# Patient Record
Sex: FEMALE | Race: WHITE | NOT HISPANIC OR LATINO | Employment: OTHER | URBAN - METROPOLITAN AREA
[De-identification: names, ages, dates, MRNs, and addresses within clinical notes are randomized per-mention and may not be internally consistent; named-entity substitution may affect disease eponyms.]

---

## 2017-11-17 ENCOUNTER — HISTORICAL (OUTPATIENT)
Dept: LAB | Facility: HOSPITAL | Age: 62
End: 2017-11-17

## 2017-11-17 LAB
ABS NEUT (OLG): 5.3 X10(3)/MCL (ref 1.5–6.9)
ALBUMIN SERPL-MCNC: 3.8 GM/DL (ref 3.4–5)
BUN SERPL-MCNC: 25 MG/DL (ref 10–20)
CALCIUM SERPL-MCNC: 9.4 MG/DL (ref 8–10.5)
CHLORIDE SERPL-SCNC: 103 MMOL/L (ref 100–108)
CO2 SERPL-SCNC: 33 MMOL/L (ref 21–35)
CREAT SERPL-MCNC: 1.2 MG/DL (ref 0.7–1.3)
CREAT UR-MCNC: 192.8 MG/DL
DEPRECATED CALCIDIOL+CALCIFEROL SERPL-MC: 8.58 NG/ML (ref 30–80)
ERYTHROCYTE [DISTWIDTH] IN BLOOD BY AUTOMATED COUNT: 13.3 % (ref 11.5–17)
FERRITIN SERPL-MCNC: 52 NG/ML (ref 3–252)
GLUCOSE SERPL-MCNC: 100 MG/DL (ref 75–116)
HCT VFR BLD AUTO: 44.4 % (ref 36–48)
HGB BLD-MCNC: 14.3 GM/DL (ref 12–16)
IRON SATN MFR SERPL: 10 % (ref 15–55)
IRON SERPL-MCNC: 40 MCG/DL (ref 50–170)
MCH RBC QN AUTO: 28 PG (ref 27–34)
MCHC RBC AUTO-ENTMCNC: 32 GM/DL (ref 31–36)
MCV RBC AUTO: 86 FL (ref 80–99)
PHOSPHATE SERPL-MCNC: 4.4 MG/DL (ref 2.6–4.7)
PLATELET # BLD AUTO: 217 X10(3)/MCL (ref 140–400)
PMV BLD AUTO: 10.5 FL (ref 6.8–10)
POTASSIUM SERPL-SCNC: 4.8 MMOL/L (ref 3.6–5.2)
PROT UR STRIP-MCNC: 32 MG/DL (ref 0–10)
PTH-INTACT SERPL-MCNC: 57.7 PG/DL (ref 14–72)
RBC # BLD AUTO: 5.17 X10(6)/MCL (ref 4.2–5.4)
SODIUM SERPL-SCNC: 140 MMOL/L (ref 135–145)
TIBC SERPL-MCNC: 354 MCG/DL (ref 150–375)
TIBC SERPL-MCNC: 394 MCG/DL (ref 250–450)
WBC # SPEC AUTO: 7.1 X10(3)/MCL (ref 4.5–11.5)

## 2018-07-12 ENCOUNTER — HISTORICAL (OUTPATIENT)
Dept: LAB | Facility: HOSPITAL | Age: 63
End: 2018-07-12

## 2018-07-12 LAB
ABS NEUT (OLG): 7.9 X10(3)/MCL (ref 1.5–6.9)
ALBUMIN SERPL-MCNC: 3.9 GM/DL (ref 3.4–5)
BUN SERPL-MCNC: 21 MG/DL (ref 10–20)
CALCIUM SERPL-MCNC: 10.1 MG/DL (ref 8–10.5)
CHLORIDE SERPL-SCNC: 103 MMOL/L (ref 100–108)
CO2 SERPL-SCNC: 25 MMOL/L (ref 21–35)
CREAT SERPL-MCNC: 1.2 MG/DL (ref 0.7–1.3)
CREAT UR-MCNC: 189.8 MG/DL
DEPRECATED CALCIDIOL+CALCIFEROL SERPL-MC: 8 NG/ML (ref 30–80)
ERYTHROCYTE [DISTWIDTH] IN BLOOD BY AUTOMATED COUNT: 13.7 % (ref 11.5–17)
FERRITIN SERPL-MCNC: 55 NG/ML (ref 3–252)
GLUCOSE SERPL-MCNC: 102 MG/DL (ref 75–116)
HCT VFR BLD AUTO: 45.2 % (ref 36–48)
HGB BLD-MCNC: 14.3 GM/DL (ref 12–16)
IRON SATN MFR SERPL: 14 % (ref 15–55)
IRON SERPL-MCNC: 52 MCG/DL (ref 50–170)
MCH RBC QN AUTO: 27 PG (ref 27–34)
MCHC RBC AUTO-ENTMCNC: 32 GM/DL (ref 31–36)
MCV RBC AUTO: 85 FL (ref 80–99)
PHOSPHATE SERPL-MCNC: 4.6 MG/DL (ref 2.6–4.7)
PLATELET # BLD AUTO: 240 X10(3)/MCL (ref 140–400)
PMV BLD AUTO: 10.5 FL (ref 6.8–10)
POTASSIUM SERPL-SCNC: 4 MMOL/L (ref 3.6–5.2)
PROT UR STRIP-MCNC: 47 MG/DL (ref 0–10)
PTH-INTACT SERPL-MCNC: 42.4 PG/ML (ref 18.4–80.1)
RBC # BLD AUTO: 5.3 X10(6)/MCL (ref 4.2–5.4)
SODIUM SERPL-SCNC: 141 MMOL/L (ref 135–145)
TIBC SERPL-MCNC: 323 MCG/DL (ref 150–375)
TIBC SERPL-MCNC: 375 MCG/DL (ref 250–450)
WBC # SPEC AUTO: 10.3 X10(3)/MCL (ref 4.5–11.5)

## 2018-07-26 ENCOUNTER — HISTORICAL (OUTPATIENT)
Dept: ADMINISTRATIVE | Facility: HOSPITAL | Age: 63
End: 2018-07-26

## 2018-08-30 ENCOUNTER — HISTORICAL (OUTPATIENT)
Dept: LAB | Facility: HOSPITAL | Age: 63
End: 2018-08-30

## 2018-08-30 LAB
CHOLEST SERPL-MCNC: 159 MG/DL (ref 140–200)
CHOLEST/HDLC SERPL: 2 MG/DL (ref 0–8)
HDLC SERPL-MCNC: 67 MG/DL (ref 35–59)
LDLC SERPL CALC-MCNC: 84 MG/DL (ref 100–129)
TRIGL SERPL-MCNC: 92 MG/DL (ref 35–150)
VLDLC SERPL CALC-MCNC: 18 MG/DL

## 2018-10-17 ENCOUNTER — HOSPITAL ENCOUNTER (OUTPATIENT)
Dept: MEDSURG UNIT | Facility: HOSPITAL | Age: 63
End: 2018-10-18
Attending: INTERNAL MEDICINE | Admitting: INTERNAL MEDICINE

## 2018-10-18 LAB
ABS NEUT (OLG): 7.41 X10(3)/MCL (ref 2.1–9.2)
ALBUMIN SERPL-MCNC: 2.9 GM/DL (ref 3.4–5)
ALBUMIN/GLOB SERPL: 0.9 RATIO (ref 1.1–2)
ALP SERPL-CCNC: 78 UNIT/L (ref 38–126)
ALT SERPL-CCNC: 19 UNIT/L (ref 12–78)
AST SERPL-CCNC: 18 UNIT/L (ref 15–37)
BASOPHILS # BLD AUTO: 0 X10(3)/MCL (ref 0–0.2)
BASOPHILS NFR BLD AUTO: 0 %
BILIRUB SERPL-MCNC: 0.2 MG/DL (ref 0.2–1)
BILIRUBIN DIRECT+TOT PNL SERPL-MCNC: 0.1 MG/DL (ref 0–0.5)
BILIRUBIN DIRECT+TOT PNL SERPL-MCNC: 0.1 MG/DL (ref 0–0.8)
BUN SERPL-MCNC: 27 MG/DL (ref 7–18)
CALCIUM SERPL-MCNC: 8.3 MG/DL (ref 8.5–10.1)
CHLORIDE SERPL-SCNC: 104 MMOL/L (ref 98–107)
CO2 SERPL-SCNC: 27 MMOL/L (ref 21–32)
CREAT SERPL-MCNC: 1.37 MG/DL (ref 0.55–1.02)
EOSINOPHIL # BLD AUTO: 0 X10(3)/MCL (ref 0–0.9)
EOSINOPHIL NFR BLD AUTO: 0 %
ERYTHROCYTE [DISTWIDTH] IN BLOOD BY AUTOMATED COUNT: 13.2 % (ref 11.5–17)
GLOBULIN SER-MCNC: 3.1 GM/DL (ref 2.4–3.5)
GLUCOSE SERPL-MCNC: 255 MG/DL (ref 74–106)
HCT VFR BLD AUTO: 38.3 % (ref 37–47)
HGB BLD-MCNC: 11.6 GM/DL (ref 12–16)
LYMPHOCYTES # BLD AUTO: 1.5 X10(3)/MCL (ref 0.6–4.6)
LYMPHOCYTES NFR BLD AUTO: 15 %
MCH RBC QN AUTO: 27.7 PG (ref 27–31)
MCHC RBC AUTO-ENTMCNC: 30.3 GM/DL (ref 33–36)
MCV RBC AUTO: 91.4 FL (ref 80–94)
MONOCYTES # BLD AUTO: 0.8 X10(3)/MCL (ref 0.1–1.3)
MONOCYTES NFR BLD AUTO: 8 %
NEUTROPHILS # BLD AUTO: 7.41 X10(3)/MCL (ref 2.1–9.2)
NEUTROPHILS NFR BLD AUTO: 76 %
PLATELET # BLD AUTO: 197 X10(3)/MCL (ref 130–400)
PMV BLD AUTO: 10.3 FL (ref 9.4–12.4)
POTASSIUM SERPL-SCNC: 4.5 MMOL/L (ref 3.5–5.1)
PROT SERPL-MCNC: 6 GM/DL (ref 6.4–8.2)
RBC # BLD AUTO: 4.19 X10(6)/MCL (ref 4.2–5.4)
SODIUM SERPL-SCNC: 139 MMOL/L (ref 136–145)
WBC # SPEC AUTO: 9.8 X10(3)/MCL (ref 4.5–11.5)

## 2018-12-27 ENCOUNTER — HOSPITAL ENCOUNTER (OUTPATIENT)
Dept: ADMINISTRATIVE | Facility: HOSPITAL | Age: 63
End: 2018-12-28
Attending: INTERNAL MEDICINE | Admitting: INTERNAL MEDICINE

## 2018-12-27 LAB
ABS NEUT (OLG): 6.77 X10(3)/MCL (ref 2.1–9.2)
APTT PPP: 28.4 SECOND(S) (ref 24.8–36.9)
BASOPHILS # BLD AUTO: 0 X10(3)/MCL (ref 0–0.2)
BASOPHILS NFR BLD AUTO: 0 %
BUN SERPL-MCNC: 31 MG/DL (ref 7–18)
CALCIUM SERPL-MCNC: 8.7 MG/DL (ref 8.5–10.1)
CHLORIDE SERPL-SCNC: 106 MMOL/L (ref 98–107)
CO2 SERPL-SCNC: 24 MMOL/L (ref 21–32)
CREAT SERPL-MCNC: 1.68 MG/DL (ref 0.55–1.02)
CREAT/UREA NIT SERPL: 18.5
EOSINOPHIL # BLD AUTO: 0.1 X10(3)/MCL (ref 0–0.9)
EOSINOPHIL NFR BLD AUTO: 1 %
ERYTHROCYTE [DISTWIDTH] IN BLOOD BY AUTOMATED COUNT: 13.3 % (ref 11.5–17)
GLUCOSE SERPL-MCNC: 204 MG/DL (ref 74–106)
HCT VFR BLD AUTO: 40.1 % (ref 37–47)
HGB BLD-MCNC: 12.2 GM/DL (ref 12–16)
INR PPP: 0.9 (ref 0–1.3)
LYMPHOCYTES # BLD AUTO: 1.3 X10(3)/MCL (ref 0.6–4.6)
LYMPHOCYTES NFR BLD AUTO: 14 %
MCH RBC QN AUTO: 26.1 PG (ref 27–31)
MCHC RBC AUTO-ENTMCNC: 30.4 GM/DL (ref 33–36)
MCV RBC AUTO: 85.9 FL (ref 80–94)
MONOCYTES # BLD AUTO: 0.6 X10(3)/MCL (ref 0.1–1.3)
MONOCYTES NFR BLD AUTO: 7 %
NEUTROPHILS # BLD AUTO: 6.77 X10(3)/MCL (ref 2.1–9.2)
NEUTROPHILS NFR BLD AUTO: 76 %
PLATELET # BLD AUTO: 219 X10(3)/MCL (ref 130–400)
PMV BLD AUTO: 10.4 FL (ref 9.4–12.4)
POTASSIUM SERPL-SCNC: 4.1 MMOL/L (ref 3.5–5.1)
PROTHROMBIN TIME: 12.4 SECOND(S) (ref 12.2–14.7)
RBC # BLD AUTO: 4.67 X10(6)/MCL (ref 4.2–5.4)
SODIUM SERPL-SCNC: 138 MMOL/L (ref 136–145)
WBC # SPEC AUTO: 8.9 X10(3)/MCL (ref 4.5–11.5)

## 2018-12-28 LAB
ABS NEUT (OLG): 6.38 X10(3)/MCL (ref 2.1–9.2)
ALBUMIN SERPL-MCNC: 3.3 GM/DL (ref 3.4–5)
ALBUMIN/GLOB SERPL: 1 {RATIO}
ALP SERPL-CCNC: 99 UNIT/L (ref 38–126)
ALT SERPL-CCNC: 15 UNIT/L (ref 12–78)
AST SERPL-CCNC: 19 UNIT/L (ref 15–37)
BASOPHILS # BLD AUTO: 0 X10(3)/MCL (ref 0–0.2)
BASOPHILS NFR BLD AUTO: 1 %
BILIRUB SERPL-MCNC: 0.2 MG/DL (ref 0.2–1)
BILIRUBIN DIRECT+TOT PNL SERPL-MCNC: 0.1 MG/DL (ref 0–0.2)
BILIRUBIN DIRECT+TOT PNL SERPL-MCNC: 0.1 MG/DL (ref 0–0.8)
BUN SERPL-MCNC: 30 MG/DL (ref 7–18)
CALCIUM SERPL-MCNC: 8.4 MG/DL (ref 8.5–10.1)
CHLORIDE SERPL-SCNC: 105 MMOL/L (ref 98–107)
CO2 SERPL-SCNC: 26 MMOL/L (ref 21–32)
CREAT SERPL-MCNC: 1.6 MG/DL (ref 0.55–1.02)
EOSINOPHIL # BLD AUTO: 0.1 X10(3)/MCL (ref 0–0.9)
EOSINOPHIL NFR BLD AUTO: 1 %
ERYTHROCYTE [DISTWIDTH] IN BLOOD BY AUTOMATED COUNT: 13.3 % (ref 11.5–17)
GLOBULIN SER-MCNC: 3.3 GM/DL (ref 2.4–3.5)
GLUCOSE SERPL-MCNC: 324 MG/DL (ref 74–106)
HCT VFR BLD AUTO: 40 % (ref 37–47)
HGB BLD-MCNC: 11.9 GM/DL (ref 12–16)
LYMPHOCYTES # BLD AUTO: 1.4 X10(3)/MCL (ref 0.6–4.6)
LYMPHOCYTES NFR BLD AUTO: 16 %
MCH RBC QN AUTO: 26.4 PG (ref 27–31)
MCHC RBC AUTO-ENTMCNC: 29.8 GM/DL (ref 33–36)
MCV RBC AUTO: 88.7 FL (ref 80–94)
MONOCYTES # BLD AUTO: 0.8 X10(3)/MCL (ref 0.1–1.3)
MONOCYTES NFR BLD AUTO: 9 %
NEUTROPHILS # BLD AUTO: 6.38 X10(3)/MCL (ref 2.1–9.2)
NEUTROPHILS NFR BLD AUTO: 72 %
PLATELET # BLD AUTO: 235 X10(3)/MCL (ref 130–400)
PMV BLD AUTO: 10.5 FL (ref 9.4–12.4)
POTASSIUM SERPL-SCNC: 5 MMOL/L (ref 3.5–5.1)
PROT SERPL-MCNC: 6.6 GM/DL (ref 6.4–8.2)
RBC # BLD AUTO: 4.51 X10(6)/MCL (ref 4.2–5.4)
SODIUM SERPL-SCNC: 139 MMOL/L (ref 136–145)
WBC # SPEC AUTO: 8.8 X10(3)/MCL (ref 4.5–11.5)

## 2019-03-27 ENCOUNTER — HISTORICAL (OUTPATIENT)
Dept: LAB | Facility: HOSPITAL | Age: 64
End: 2019-03-27

## 2019-03-27 LAB
ABS NEUT (OLG): 5 X10(3)/MCL (ref 1.5–6.9)
ALBUMIN SERPL-MCNC: 3.4 GM/DL (ref 3.4–5)
BUN SERPL-MCNC: 26 MG/DL (ref 10–20)
CALCIUM SERPL-MCNC: 9.2 MG/DL (ref 8–10.5)
CHLORIDE SERPL-SCNC: 106 MMOL/L (ref 100–108)
CO2 SERPL-SCNC: 30 MMOL/L (ref 21–35)
CREAT SERPL-MCNC: 1.3 MG/DL (ref 0.7–1.3)
CREAT UR-MCNC: 148.9 MG/DL
ERYTHROCYTE [DISTWIDTH] IN BLOOD BY AUTOMATED COUNT: 14.4 % (ref 11.5–17)
GLUCOSE SERPL-MCNC: 69 MG/DL (ref 75–116)
HCT VFR BLD AUTO: 44.7 % (ref 36–48)
HGB BLD-MCNC: 13.2 GM/DL (ref 12–16)
MCH RBC QN AUTO: 26 PG (ref 27–34)
MCHC RBC AUTO-ENTMCNC: 30 GM/DL (ref 31–36)
MCV RBC AUTO: 89 FL (ref 80–99)
PHOSPHATE SERPL-MCNC: 4.3 MG/DL (ref 2.6–4.7)
PLATELET # BLD AUTO: 195 X10(3)/MCL (ref 140–400)
PMV BLD AUTO: 10.6 FL (ref 6.8–10)
POTASSIUM SERPL-SCNC: 4.7 MMOL/L (ref 3.6–5.2)
PROT UR STRIP-MCNC: 24 MG/DL (ref 0–10)
RBC # BLD AUTO: 5.01 X10(6)/MCL (ref 4.2–5.4)
SODIUM SERPL-SCNC: 143 MMOL/L (ref 135–145)
WBC # SPEC AUTO: 7.4 X10(3)/MCL (ref 4.5–11.5)

## 2019-06-12 ENCOUNTER — HISTORICAL (OUTPATIENT)
Dept: ADMINISTRATIVE | Facility: HOSPITAL | Age: 64
End: 2019-06-12

## 2019-06-14 ENCOUNTER — HISTORICAL (OUTPATIENT)
Dept: MEDSURG UNIT | Facility: HOSPITAL | Age: 64
End: 2019-06-14

## 2019-06-15 LAB
ABS NEUT (OLG): 4.74 X10(3)/MCL (ref 2.1–9.2)
ALBUMIN SERPL-MCNC: 3.4 GM/DL (ref 3.4–5)
ALBUMIN/GLOB SERPL: 1.1 RATIO (ref 1.1–2)
ALP SERPL-CCNC: 100 UNIT/L (ref 38–126)
ALT SERPL-CCNC: 20 UNIT/L (ref 12–78)
AST SERPL-CCNC: 28 UNIT/L (ref 15–37)
BASOPHILS # BLD AUTO: 0 X10(3)/MCL (ref 0–0.2)
BASOPHILS NFR BLD AUTO: 0 %
BILIRUB SERPL-MCNC: 0.3 MG/DL (ref 0.2–1)
BILIRUBIN DIRECT+TOT PNL SERPL-MCNC: 0.1 MG/DL (ref 0–0.5)
BILIRUBIN DIRECT+TOT PNL SERPL-MCNC: 0.2 MG/DL (ref 0–0.8)
BUN SERPL-MCNC: 22 MG/DL (ref 7–18)
CALCIUM SERPL-MCNC: 8.7 MG/DL (ref 8.5–10.1)
CHLORIDE SERPL-SCNC: 105 MMOL/L (ref 98–107)
CO2 SERPL-SCNC: 26 MMOL/L (ref 21–32)
CREAT SERPL-MCNC: 1.22 MG/DL (ref 0.55–1.02)
EOSINOPHIL # BLD AUTO: 0.1 X10(3)/MCL (ref 0–0.9)
EOSINOPHIL NFR BLD AUTO: 1 %
ERYTHROCYTE [DISTWIDTH] IN BLOOD BY AUTOMATED COUNT: 14.1 % (ref 11.5–17)
GLOBULIN SER-MCNC: 3.1 GM/DL (ref 2.4–3.5)
GLUCOSE SERPL-MCNC: 144 MG/DL (ref 74–106)
HCT VFR BLD AUTO: 41.1 % (ref 37–47)
HGB BLD-MCNC: 12.3 GM/DL (ref 12–16)
LYMPHOCYTES # BLD AUTO: 1.3 X10(3)/MCL (ref 0.6–4.6)
LYMPHOCYTES NFR BLD AUTO: 20 %
MCH RBC QN AUTO: 26.8 PG (ref 27–31)
MCHC RBC AUTO-ENTMCNC: 29.9 GM/DL (ref 33–36)
MCV RBC AUTO: 89.5 FL (ref 80–94)
MONOCYTES # BLD AUTO: 0.6 X10(3)/MCL (ref 0.1–1.3)
MONOCYTES NFR BLD AUTO: 9 %
NEUTROPHILS # BLD AUTO: 4.74 X10(3)/MCL (ref 2.1–9.2)
NEUTROPHILS NFR BLD AUTO: 69 %
PLATELET # BLD AUTO: 200 X10(3)/MCL (ref 130–400)
PMV BLD AUTO: 10.6 FL (ref 9.4–12.4)
POTASSIUM SERPL-SCNC: 4.9 MMOL/L (ref 3.5–5.1)
PROT SERPL-MCNC: 6.5 GM/DL (ref 6.4–8.2)
RBC # BLD AUTO: 4.59 X10(6)/MCL (ref 4.2–5.4)
SODIUM SERPL-SCNC: 137 MMOL/L (ref 136–145)
WBC # SPEC AUTO: 6.8 X10(3)/MCL (ref 4.5–11.5)

## 2019-09-19 ENCOUNTER — HISTORICAL (OUTPATIENT)
Dept: MEDSURG UNIT | Facility: HOSPITAL | Age: 64
End: 2019-09-19

## 2019-09-20 LAB
ABS NEUT (OLG): 7.04 X10(3)/MCL (ref 2.1–9.2)
ALBUMIN SERPL-MCNC: 3.2 GM/DL (ref 3.4–5)
ALBUMIN/GLOB SERPL: 1.2 {RATIO}
ALP SERPL-CCNC: 71 UNIT/L (ref 38–126)
ALT SERPL-CCNC: 20 UNIT/L (ref 12–78)
AST SERPL-CCNC: 35 UNIT/L (ref 15–37)
BASOPHILS # BLD AUTO: 0 X10(3)/MCL (ref 0–0.2)
BASOPHILS NFR BLD AUTO: 0 %
BILIRUB SERPL-MCNC: 0.4 MG/DL (ref 0.2–1)
BILIRUBIN DIRECT+TOT PNL SERPL-MCNC: 0.1 MG/DL (ref 0–0.2)
BILIRUBIN DIRECT+TOT PNL SERPL-MCNC: 0.3 MG/DL (ref 0–0.8)
BUN SERPL-MCNC: 22 MG/DL (ref 7–18)
CALCIUM SERPL-MCNC: 8.7 MG/DL (ref 8.5–10.1)
CHLORIDE SERPL-SCNC: 110 MMOL/L (ref 98–107)
CO2 SERPL-SCNC: 23 MMOL/L (ref 21–32)
CREAT SERPL-MCNC: 1.06 MG/DL (ref 0.55–1.02)
EOSINOPHIL # BLD AUTO: 0 X10(3)/MCL (ref 0–0.9)
EOSINOPHIL NFR BLD AUTO: 0 %
ERYTHROCYTE [DISTWIDTH] IN BLOOD BY AUTOMATED COUNT: 14.1 % (ref 11.5–17)
GLOBULIN SER-MCNC: 2.7 GM/DL (ref 2.4–3.5)
GLUCOSE SERPL-MCNC: 144 MG/DL (ref 74–106)
HCT VFR BLD AUTO: 40.7 % (ref 37–47)
HGB BLD-MCNC: 12.1 GM/DL (ref 12–16)
LYMPHOCYTES # BLD AUTO: 1.6 X10(3)/MCL (ref 0.6–4.6)
LYMPHOCYTES NFR BLD AUTO: 17 %
MCH RBC QN AUTO: 26.4 PG (ref 27–31)
MCHC RBC AUTO-ENTMCNC: 29.7 GM/DL (ref 33–36)
MCV RBC AUTO: 88.7 FL (ref 80–94)
MONOCYTES # BLD AUTO: 0.8 X10(3)/MCL (ref 0.1–1.3)
MONOCYTES NFR BLD AUTO: 8 %
NEUTROPHILS # BLD AUTO: 7.04 X10(3)/MCL (ref 2.1–9.2)
NEUTROPHILS NFR BLD AUTO: 74 %
PLATELET # BLD AUTO: 192 X10(3)/MCL (ref 130–400)
PMV BLD AUTO: 10.3 FL (ref 9.4–12.4)
POTASSIUM SERPL-SCNC: 4.3 MMOL/L (ref 3.5–5.1)
PROT SERPL-MCNC: 5.9 GM/DL (ref 6.4–8.2)
RBC # BLD AUTO: 4.59 X10(6)/MCL (ref 4.2–5.4)
SODIUM SERPL-SCNC: 141 MMOL/L (ref 136–145)
WBC # SPEC AUTO: 9.5 X10(3)/MCL (ref 4.5–11.5)

## 2019-12-11 ENCOUNTER — HISTORICAL (OUTPATIENT)
Dept: ADMINISTRATIVE | Facility: HOSPITAL | Age: 64
End: 2019-12-11

## 2020-02-18 ENCOUNTER — HISTORICAL (OUTPATIENT)
Dept: MEDSURG UNIT | Facility: HOSPITAL | Age: 65
End: 2020-02-18

## 2020-02-18 LAB
BUN SERPL-MCNC: 25 MG/DL (ref 7–18)
CALCIUM SERPL-MCNC: 8.7 MG/DL (ref 8.5–10.1)
CHLORIDE SERPL-SCNC: 106 MMOL/L (ref 98–107)
CO2 SERPL-SCNC: 29 MMOL/L (ref 21–32)
CREAT SERPL-MCNC: 1.51 MG/DL (ref 0.55–1.02)
CREAT/UREA NIT SERPL: 16.6
GLUCOSE SERPL-MCNC: 181 MG/DL (ref 74–106)
POTASSIUM SERPL-SCNC: 4.7 MMOL/L (ref 3.5–5.1)
SODIUM SERPL-SCNC: 141 MMOL/L (ref 136–145)

## 2020-02-19 LAB
ABS NEUT (OLG): 4.55 X10(3)/MCL (ref 2.1–9.2)
ALBUMIN SERPL-MCNC: 2.9 GM/DL (ref 3.4–5)
ALBUMIN/GLOB SERPL: 1.1 {RATIO}
ALP SERPL-CCNC: 84 UNIT/L (ref 38–126)
ALT SERPL-CCNC: 17 UNIT/L (ref 12–78)
AST SERPL-CCNC: 14 UNIT/L (ref 15–37)
BASOPHILS # BLD AUTO: 0 X10(3)/MCL (ref 0–0.2)
BASOPHILS NFR BLD AUTO: 0 %
BILIRUB SERPL-MCNC: 0.4 MG/DL (ref 0.2–1)
BILIRUBIN DIRECT+TOT PNL SERPL-MCNC: 0.1 MG/DL (ref 0–0.2)
BILIRUBIN DIRECT+TOT PNL SERPL-MCNC: 0.3 MG/DL (ref 0–0.8)
BUN SERPL-MCNC: 15 MG/DL (ref 7–18)
CALCIUM SERPL-MCNC: 7.8 MG/DL (ref 8.5–10.1)
CHLORIDE SERPL-SCNC: 106 MMOL/L (ref 98–107)
CO2 SERPL-SCNC: 26 MMOL/L (ref 21–32)
CREAT SERPL-MCNC: 1.2 MG/DL (ref 0.55–1.02)
EOSINOPHIL # BLD AUTO: 0.1 X10(3)/MCL (ref 0–0.9)
EOSINOPHIL NFR BLD AUTO: 1 %
ERYTHROCYTE [DISTWIDTH] IN BLOOD BY AUTOMATED COUNT: 13.8 % (ref 11.5–17)
GLOBULIN SER-MCNC: 2.6 GM/DL (ref 2.4–3.5)
GLUCOSE SERPL-MCNC: 284 MG/DL (ref 74–106)
HCT VFR BLD AUTO: 31.1 % (ref 37–47)
HGB BLD-MCNC: 8.9 GM/DL (ref 12–16)
LYMPHOCYTES # BLD AUTO: 1.5 X10(3)/MCL (ref 0.6–4.6)
LYMPHOCYTES NFR BLD AUTO: 22 %
MCH RBC QN AUTO: 24.5 PG (ref 27–31)
MCHC RBC AUTO-ENTMCNC: 28.6 GM/DL (ref 33–36)
MCV RBC AUTO: 85.7 FL (ref 80–94)
MONOCYTES # BLD AUTO: 0.5 X10(3)/MCL (ref 0.1–1.3)
MONOCYTES NFR BLD AUTO: 7 %
NEUTROPHILS # BLD AUTO: 4.55 X10(3)/MCL (ref 2.1–9.2)
NEUTROPHILS NFR BLD AUTO: 69 %
PLATELET # BLD AUTO: 198 X10(3)/MCL (ref 130–400)
PMV BLD AUTO: 10.8 FL (ref 9.4–12.4)
POTASSIUM SERPL-SCNC: 4.5 MMOL/L (ref 3.5–5.1)
PROT SERPL-MCNC: 5.5 GM/DL (ref 6.4–8.2)
RBC # BLD AUTO: 3.63 X10(6)/MCL (ref 4.2–5.4)
SODIUM SERPL-SCNC: 137 MMOL/L (ref 136–145)
WBC # SPEC AUTO: 6.6 X10(3)/MCL (ref 4.5–11.5)

## 2020-03-12 ENCOUNTER — HISTORICAL (OUTPATIENT)
Dept: ADMINISTRATIVE | Facility: HOSPITAL | Age: 65
End: 2020-03-12

## 2020-04-16 ENCOUNTER — HISTORICAL (OUTPATIENT)
Dept: ADMINISTRATIVE | Facility: HOSPITAL | Age: 65
End: 2020-04-16

## 2020-08-13 ENCOUNTER — HISTORICAL (OUTPATIENT)
Dept: ADMINISTRATIVE | Facility: HOSPITAL | Age: 65
End: 2020-08-13

## 2020-08-14 ENCOUNTER — HISTORICAL (OUTPATIENT)
Dept: ADMINISTRATIVE | Facility: HOSPITAL | Age: 65
End: 2020-08-14

## 2020-09-29 ENCOUNTER — HISTORICAL (OUTPATIENT)
Dept: ADMINISTRATIVE | Facility: HOSPITAL | Age: 65
End: 2020-09-29

## 2020-09-29 LAB
INR PPP: 0.9 (ref 0–1.3)
PROTHROMBIN TIME: 11.5 SECOND(S) (ref 11.1–13.7)

## 2020-09-30 LAB
ABS NEUT (OLG): 6.69 X10(3)/MCL (ref 2.1–9.2)
ALBUMIN SERPL-MCNC: 3.4 GM/DL (ref 3.4–4.8)
ALBUMIN/GLOB SERPL: 1 RATIO (ref 1.1–2)
ALP SERPL-CCNC: 83 UNIT/L (ref 40–150)
ALT SERPL-CCNC: 13 UNIT/L (ref 0–55)
AST SERPL-CCNC: 24 UNIT/L (ref 5–34)
BASOPHILS # BLD AUTO: 0 X10(3)/MCL (ref 0–0.2)
BASOPHILS NFR BLD AUTO: 0 %
BILIRUB SERPL-MCNC: 0.4 MG/DL
BILIRUBIN DIRECT+TOT PNL SERPL-MCNC: 0.2 MG/DL (ref 0–0.5)
BILIRUBIN DIRECT+TOT PNL SERPL-MCNC: 0.2 MG/DL (ref 0–0.8)
BUN SERPL-MCNC: 17.9 MG/DL (ref 9.8–20.1)
CALCIUM SERPL-MCNC: 8.8 MG/DL (ref 8.4–10.2)
CHLORIDE SERPL-SCNC: 105 MMOL/L (ref 98–107)
CO2 SERPL-SCNC: 28 MMOL/L (ref 23–31)
CREAT SERPL-MCNC: 1.05 MG/DL (ref 0.55–1.02)
EOSINOPHIL # BLD AUTO: 0 X10(3)/MCL (ref 0–0.9)
EOSINOPHIL NFR BLD AUTO: 0 %
ERYTHROCYTE [DISTWIDTH] IN BLOOD BY AUTOMATED COUNT: 15 % (ref 11.5–17)
GLOBULIN SER-MCNC: 3.3 GM/DL (ref 2.4–3.5)
GLUCOSE SERPL-MCNC: 217 MG/DL (ref 82–115)
HCT VFR BLD AUTO: 32.9 % (ref 37–47)
HGB BLD-MCNC: 9.5 GM/DL (ref 12–16)
LYMPHOCYTES # BLD AUTO: 1.2 X10(3)/MCL (ref 0.6–4.6)
LYMPHOCYTES NFR BLD AUTO: 14 %
MCH RBC QN AUTO: 23.5 PG (ref 27–31)
MCHC RBC AUTO-ENTMCNC: 28.9 GM/DL (ref 33–36)
MCV RBC AUTO: 81.2 FL (ref 80–94)
MONOCYTES # BLD AUTO: 0.7 X10(3)/MCL (ref 0.1–1.3)
MONOCYTES NFR BLD AUTO: 8 %
NEUTROPHILS # BLD AUTO: 6.69 X10(3)/MCL (ref 2.1–9.2)
NEUTROPHILS NFR BLD AUTO: 77 %
PLATELET # BLD AUTO: 248 X10(3)/MCL (ref 130–400)
PMV BLD AUTO: 10.5 FL (ref 9.4–12.4)
POTASSIUM SERPL-SCNC: 4 MMOL/L (ref 3.5–5.1)
PROT SERPL-MCNC: 6.7 GM/DL (ref 5.8–7.6)
RBC # BLD AUTO: 4.05 X10(6)/MCL (ref 4.2–5.4)
SODIUM SERPL-SCNC: 140 MMOL/L (ref 136–145)
WBC # SPEC AUTO: 8.7 X10(3)/MCL (ref 4.5–11.5)

## 2021-05-20 ENCOUNTER — HISTORICAL (OUTPATIENT)
Dept: ADMINISTRATIVE | Facility: HOSPITAL | Age: 66
End: 2021-05-20

## 2021-08-05 ENCOUNTER — OFFICE VISIT (OUTPATIENT)
Dept: UROLOGY | Facility: CLINIC | Age: 66
End: 2021-08-05
Payer: MEDICARE

## 2021-08-05 VITALS
DIASTOLIC BLOOD PRESSURE: 69 MMHG | WEIGHT: 128 LBS | HEART RATE: 68 BPM | BODY MASS INDEX: 23.55 KG/M2 | HEIGHT: 62 IN | SYSTOLIC BLOOD PRESSURE: 156 MMHG

## 2021-08-05 DIAGNOSIS — N39.41 URGE INCONTINENCE: Primary | ICD-10-CM

## 2021-08-05 LAB — POC RESIDUAL URINE VOLUME: 18 ML (ref 0–100)

## 2021-08-05 PROCEDURE — 99203 PR OFFICE/OUTPT VISIT, NEW, LEVL III, 30-44 MIN: ICD-10-PCS | Mod: S$GLB,,, | Performed by: NURSE PRACTITIONER

## 2021-08-05 PROCEDURE — 99203 OFFICE O/P NEW LOW 30 MIN: CPT | Mod: S$GLB,,, | Performed by: NURSE PRACTITIONER

## 2021-08-05 PROCEDURE — 51798 US URINE CAPACITY MEASURE: CPT | Mod: S$GLB,,, | Performed by: NURSE PRACTITIONER

## 2021-08-05 PROCEDURE — 51798 POCT BLADDER SCAN: ICD-10-PCS | Mod: S$GLB,,, | Performed by: NURSE PRACTITIONER

## 2021-08-05 RX ORDER — MONTELUKAST SODIUM 10 MG/1
10 TABLET ORAL NIGHTLY
COMMUNITY
Start: 2021-08-03

## 2021-08-05 RX ORDER — RIVAROXABAN 20 MG/1
TABLET, FILM COATED ORAL
COMMUNITY
Start: 2021-04-12

## 2021-08-05 RX ORDER — PANTOPRAZOLE SODIUM 40 MG/1
TABLET, DELAYED RELEASE ORAL
COMMUNITY
Start: 2021-08-03

## 2021-08-05 RX ORDER — SITAGLIPTIN 100 MG/1
TABLET, FILM COATED ORAL
COMMUNITY
Start: 2021-05-26

## 2021-08-05 RX ORDER — VITAMIN B COMPLEX
TABLET ORAL
COMMUNITY
Start: 2020-09-29

## 2021-08-05 RX ORDER — FERROUS GLUCONATE 324(38)MG
324 TABLET ORAL
COMMUNITY

## 2021-08-05 RX ORDER — CLOPIDOGREL BISULFATE 75 MG/1
TABLET ORAL
COMMUNITY
Start: 2021-05-25 | End: 2021-08-05 | Stop reason: SDUPTHER

## 2021-08-05 RX ORDER — ENALAPRIL MALEATE 20 MG/1
20 TABLET ORAL DAILY
COMMUNITY
Start: 2021-08-03

## 2021-08-05 RX ORDER — ATORVASTATIN CALCIUM 40 MG/1
TABLET, FILM COATED ORAL
COMMUNITY
Start: 2021-05-25

## 2021-08-05 RX ORDER — INSULIN DETEMIR 100 [IU]/ML
INJECTION, SOLUTION SUBCUTANEOUS
COMMUNITY
Start: 2021-05-13

## 2021-08-05 RX ORDER — PEN NEEDLE, DIABETIC 31 GX5/16"
NEEDLE, DISPOSABLE MISCELLANEOUS
COMMUNITY
Start: 2021-06-28

## 2021-08-05 RX ORDER — LEVOCETIRIZINE DIHYDROCHLORIDE 5 MG/1
2.5 TABLET, FILM COATED ORAL
COMMUNITY
Start: 2020-09-29

## 2021-08-05 RX ORDER — DONEPEZIL HYDROCHLORIDE 5 MG/1
TABLET, FILM COATED ORAL
COMMUNITY
Start: 2021-08-04

## 2021-08-05 RX ORDER — CLOPIDOGREL BISULFATE 75 MG/1
75 TABLET ORAL
COMMUNITY
Start: 2020-09-30

## 2021-08-05 RX ORDER — CHOLECALCIFEROL (VITAMIN D3) 25 MCG
1000 TABLET ORAL DAILY
COMMUNITY

## 2021-08-05 RX ORDER — FLUOXETINE 10 MG/1
CAPSULE ORAL
COMMUNITY
Start: 2021-08-03

## 2021-08-05 RX ORDER — EMPAGLIFLOZIN 25 MG/1
25 TABLET, FILM COATED ORAL NIGHTLY
COMMUNITY
Start: 2021-08-03

## 2022-02-28 ENCOUNTER — HISTORICAL (OUTPATIENT)
Dept: ADMINISTRATIVE | Facility: HOSPITAL | Age: 67
End: 2022-02-28

## 2022-04-30 NOTE — OP NOTE
Patient:   Any Duncan             MRN: [id number]            FIN: [id number]               Age:   63 years     Sex:  Female     :  1955   Associated Diagnoses:   None   Author:   Chris Perkins MD      stenting of LAD performed  groij approach    full report to follow    DC on dual antiplatelets

## 2023-02-06 ENCOUNTER — LAB VISIT (OUTPATIENT)
Dept: LAB | Facility: HOSPITAL | Age: 68
End: 2023-02-06
Attending: INTERNAL MEDICINE
Payer: MEDICARE

## 2023-02-06 DIAGNOSIS — E11.9 DIABETES MELLITUS WITHOUT COMPLICATION: ICD-10-CM

## 2023-02-06 DIAGNOSIS — I10 ESSENTIAL HYPERTENSION, MALIGNANT: Primary | ICD-10-CM

## 2023-02-06 DIAGNOSIS — I25.10 CORONARY ARTERY DISEASE, UNSPECIFIED VESSEL OR LESION TYPE, UNSPECIFIED WHETHER ANGINA PRESENT, UNSPECIFIED WHETHER NATIVE OR TRANSPLANTED HEART: ICD-10-CM

## 2023-02-06 DIAGNOSIS — E55.9 VITAMIN D DEFICIENCY: ICD-10-CM

## 2023-02-06 DIAGNOSIS — E78.00 HIGH CHOLESTEROL: ICD-10-CM

## 2023-02-06 LAB
ALBUMIN SERPL-MCNC: 4.5 G/DL (ref 3.4–5)
APPEARANCE UR: CLEAR
BASOPHILS # BLD AUTO: 0.03 X10(3)/MCL (ref 0.01–0.08)
BASOPHILS NFR BLD AUTO: 0.4 % (ref 0.1–1.2)
BILIRUB UR QL STRIP.AUTO: NEGATIVE MG/DL
BUN SERPL-MCNC: 20 MG/DL (ref 7–20)
CALCIUM SERPL-MCNC: 9.5 MG/DL (ref 8.4–10.2)
CALCIUM SERPL-MCNC: 9.6 MG/DL (ref 8.4–10.2)
CHLORIDE SERPL-SCNC: 100 MMOL/L (ref 98–110)
CO2 SERPL-SCNC: 29 MMOL/L (ref 21–32)
COLOR UR AUTO: YELLOW
CREAT SERPL-MCNC: 1.12 MG/DL (ref 0.66–1.25)
CREAT UR-MCNC: 100.9 MG/DL
CREAT/UREA NIT SERPL: 18 (ref 12–20)
EOSINOPHIL # BLD AUTO: 0.08 X10(3)/MCL (ref 0.04–0.36)
EOSINOPHIL NFR BLD AUTO: 1.1 % (ref 0.7–7)
ERYTHROCYTE [DISTWIDTH] IN BLOOD BY AUTOMATED COUNT: 14.3 % (ref 11–14.5)
GFR SERPLBLD CREATININE-BSD FMLA CKD-EPI: 53 MLS/MIN/1.73/M2
GLUCOSE SERPL-MCNC: 133 MG/DL (ref 70–115)
GLUCOSE UR QL STRIP.AUTO: >=1000 MG/DL
HCT VFR BLD AUTO: 39.7 % (ref 36–48)
HGB BLD-MCNC: 12.2 GM/DL (ref 11.8–16)
IMM GRANULOCYTES # BLD AUTO: 0.03 X10(3)/MCL (ref 0–0.03)
IMM GRANULOCYTES NFR BLD AUTO: 0.4 % (ref 0–0.5)
KETONES UR QL STRIP.AUTO: NEGATIVE MG/DL
LEUKOCYTE ESTERASE UR QL STRIP.AUTO: NEGATIVE UNIT/L
LYMPHOCYTES # BLD AUTO: 1.2 X10(3)/MCL (ref 1.16–3.74)
LYMPHOCYTES NFR BLD AUTO: 17.1 % (ref 20–55)
MCH RBC QN AUTO: 27.3 PG (ref 27–34)
MCV RBC AUTO: 88.8 FL (ref 79–99)
MEAN CELL HEMOGLOBIN CONCENTRATION (OHS) G/DL: 30.7 G/DL (ref 31–37)
MONOCYTES # BLD AUTO: 0.55 X10(3)/MCL (ref 0.24–0.36)
MONOCYTES NFR BLD AUTO: 7.8 % (ref 4.7–12.5)
NEUTROPHILS # BLD AUTO: 5.12 X10(3)/MCL (ref 1.56–6.13)
NEUTROPHILS NFR BLD AUTO: 73.2 % (ref 37–73)
NITRITE UR QL STRIP.AUTO: NEGATIVE
NRBC BLD AUTO-RTO: 0 % (ref 0–1)
PH UR STRIP.AUTO: 5.5 [PH]
PHOSPHATE SERPL-MCNC: 5.1 MG/DL (ref 2.5–4.9)
PLATELET # BLD AUTO: 247 X10(3)/MCL (ref 140–371)
PMV BLD AUTO: 10.7 FL (ref 9.4–12.4)
POTASSIUM SERPL-SCNC: 4.3 MMOL/L (ref 3.5–5.1)
PROT UR QL STRIP.AUTO: NEGATIVE MG/DL
PROT UR STRIP-MCNC: 8 MG/DL
PTH-INTACT SERPL-MCNC: 74.2 PG/ML (ref 14–73)
RBC # BLD AUTO: 4.47 X10(6)/MCL (ref 4–5.1)
RBC UR QL AUTO: NEGATIVE UNIT/L
SODIUM SERPL-SCNC: 141 MMOL/L (ref 135–145)
SP GR UR STRIP.AUTO: 1.02
UROBILINOGEN UR STRIP-ACNC: 0.2 MG/DL
WBC # SPEC AUTO: 7 X10(3)/MCL (ref 4–11.5)

## 2023-02-06 PROCEDURE — 80069 RENAL FUNCTION PANEL: CPT

## 2023-02-06 PROCEDURE — 84156 ASSAY OF PROTEIN URINE: CPT

## 2023-02-06 PROCEDURE — 85025 COMPLETE CBC W/AUTO DIFF WBC: CPT

## 2023-02-06 PROCEDURE — 36415 COLL VENOUS BLD VENIPUNCTURE: CPT

## 2023-02-06 PROCEDURE — 81003 URINALYSIS AUTO W/O SCOPE: CPT

## 2023-02-06 PROCEDURE — 83970 ASSAY OF PARATHORMONE: CPT

## 2023-02-06 PROCEDURE — 82570 ASSAY OF URINE CREATININE: CPT

## 2023-02-06 PROCEDURE — 82043 UR ALBUMIN QUANTITATIVE: CPT

## 2023-02-07 LAB
CREAT UR-MCNC: 93.1 MG/DL (ref 47–110)
MICROALBUMIN UR-MCNC: 14.7 UG/ML
MICROALBUMIN/CREAT RATIO PNL UR: 15.8 MG/GM CR (ref 0–30)

## 2023-06-08 ENCOUNTER — LAB VISIT (OUTPATIENT)
Dept: LAB | Facility: HOSPITAL | Age: 68
End: 2023-06-08
Attending: INTERNAL MEDICINE
Payer: MEDICARE

## 2023-06-08 DIAGNOSIS — E55.9 VITAMIN D DEFICIENCY: ICD-10-CM

## 2023-06-08 DIAGNOSIS — M19.90 SENILE ARTHRITIS: Primary | ICD-10-CM

## 2023-06-08 DIAGNOSIS — I10 ESSENTIAL HYPERTENSION, MALIGNANT: ICD-10-CM

## 2023-06-08 DIAGNOSIS — N18.31 CHRONIC KIDNEY DISEASE (CKD) STAGE G3A/A1, MODERATELY DECREASED GLOMERULAR FILTRATION RATE (GFR) BETWEEN 45-59 ML/MIN/1.73 SQUARE METER AND ALBUMINURIA CREATININE RATIO LESS THAN 30 MG/G: ICD-10-CM

## 2023-06-08 DIAGNOSIS — E11.9 DIABETES MELLITUS WITHOUT COMPLICATION: ICD-10-CM

## 2023-06-08 LAB
ALBUMIN SERPL-MCNC: 4.7 G/DL (ref 3.4–5)
BASOPHILS # BLD AUTO: 0.03 X10(3)/MCL (ref 0.01–0.08)
BASOPHILS NFR BLD AUTO: 0.4 % (ref 0.1–1.2)
BUN SERPL-MCNC: 26 MG/DL (ref 7–20)
CALCIUM SERPL-MCNC: 9.6 MG/DL (ref 8.4–10.2)
CALCIUM SERPL-MCNC: 9.9 MG/DL (ref 8.4–10.2)
CHLORIDE SERPL-SCNC: 99 MMOL/L (ref 98–110)
CO2 SERPL-SCNC: 22 MMOL/L (ref 21–32)
CREAT SERPL-MCNC: 1.22 MG/DL (ref 0.66–1.25)
CREAT UR-MCNC: 38.3 MG/DL
CREAT/UREA NIT SERPL: 21 (ref 12–20)
DEPRECATED CALCIDIOL+CALCIFEROL SERPL-MC: 55.9 NG/ML (ref 30–100)
EOSINOPHIL # BLD AUTO: 0.02 X10(3)/MCL (ref 0.04–0.36)
EOSINOPHIL NFR BLD AUTO: 0.3 % (ref 0.7–7)
ERYTHROCYTE [DISTWIDTH] IN BLOOD BY AUTOMATED COUNT: 14.9 % (ref 11–14.5)
GFR SERPLBLD CREATININE-BSD FMLA CKD-EPI: 48 MLS/MIN/1.73/M2
GLUCOSE SERPL-MCNC: 394 MG/DL (ref 70–115)
HCT VFR BLD AUTO: 38.7 % (ref 36–48)
HGB BLD-MCNC: 12.2 G/DL (ref 11.8–16)
IMM GRANULOCYTES # BLD AUTO: 0.03 X10(3)/MCL (ref 0–0.03)
IMM GRANULOCYTES NFR BLD AUTO: 0.4 % (ref 0–0.5)
LYMPHOCYTES # BLD AUTO: 0.96 X10(3)/MCL (ref 1.16–3.74)
LYMPHOCYTES NFR BLD AUTO: 13.2 % (ref 20–55)
MCH RBC QN AUTO: 29 PG (ref 27–34)
MCHC RBC AUTO-ENTMCNC: 31.5 G/DL (ref 31–37)
MCV RBC AUTO: 92.1 FL (ref 79–99)
MONOCYTES # BLD AUTO: 0.41 X10(3)/MCL (ref 0.24–0.36)
MONOCYTES NFR BLD AUTO: 5.6 % (ref 4.7–12.5)
NEUTROPHILS # BLD AUTO: 5.81 X10(3)/MCL (ref 1.56–6.13)
NEUTROPHILS NFR BLD AUTO: 80.1 % (ref 37–73)
NRBC BLD AUTO-RTO: 0 %
PHOSPHATE SERPL-MCNC: 5.2 MG/DL (ref 2.5–4.9)
PLATELET # BLD AUTO: 231 X10(3)/MCL (ref 140–371)
PMV BLD AUTO: 10.4 FL (ref 9.4–12.4)
POTASSIUM SERPL-SCNC: 4.7 MMOL/L (ref 3.5–5.1)
PROT UR STRIP-MCNC: 12 MG/DL
PTH-INTACT SERPL-MCNC: 35.6 PG/ML (ref 14–73)
RBC # BLD AUTO: 4.2 X10(6)/MCL (ref 4–5.1)
SODIUM SERPL-SCNC: 135 MMOL/L (ref 135–145)
WBC # SPEC AUTO: 7.26 X10(3)/MCL (ref 4–11.5)

## 2023-06-08 PROCEDURE — 82570 ASSAY OF URINE CREATININE: CPT

## 2023-06-08 PROCEDURE — 84156 ASSAY OF PROTEIN URINE: CPT

## 2023-06-08 PROCEDURE — 82652 VIT D 1 25-DIHYDROXY: CPT

## 2023-06-08 PROCEDURE — 80069 RENAL FUNCTION PANEL: CPT

## 2023-06-08 PROCEDURE — 83970 ASSAY OF PARATHORMONE: CPT

## 2023-06-08 PROCEDURE — 36415 COLL VENOUS BLD VENIPUNCTURE: CPT

## 2023-06-08 PROCEDURE — 82306 VITAMIN D 25 HYDROXY: CPT

## 2023-06-08 PROCEDURE — 85025 COMPLETE CBC W/AUTO DIFF WBC: CPT

## 2023-06-13 LAB — 1,25(OH)2D SERPL-MCNC: 26 PG/ML (ref 18–78)

## 2023-10-13 ENCOUNTER — LAB VISIT (OUTPATIENT)
Dept: LAB | Facility: HOSPITAL | Age: 68
End: 2023-10-13
Attending: INTERNAL MEDICINE
Payer: MEDICARE

## 2023-10-13 DIAGNOSIS — M19.90 SENILE ARTHRITIS: Primary | ICD-10-CM

## 2023-10-13 DIAGNOSIS — E11.9 DIABETES MELLITUS WITHOUT COMPLICATION: ICD-10-CM

## 2023-10-13 DIAGNOSIS — N18.31 CHRONIC KIDNEY DISEASE (CKD) STAGE G3A/A1, MODERATELY DECREASED GLOMERULAR FILTRATION RATE (GFR) BETWEEN 45-59 ML/MIN/1.73 SQUARE METER AND ALBUMINURIA CREATININE RATIO LESS THAN 30 MG/G: ICD-10-CM

## 2023-10-13 DIAGNOSIS — D63.8 ANEMIA DUE TO CHRONIC ILLNESS: ICD-10-CM

## 2023-10-13 LAB
ALBUMIN SERPL-MCNC: 4 G/DL (ref 3.4–5)
APPEARANCE UR: CLEAR
BASOPHILS # BLD AUTO: 0.03 X10(3)/MCL (ref 0.01–0.08)
BASOPHILS NFR BLD AUTO: 0.3 % (ref 0.1–1.2)
BILIRUB UR QL STRIP.AUTO: NEGATIVE
BUN SERPL-MCNC: 18 MG/DL (ref 7–20)
CALCIUM SERPL-MCNC: 9.3 MG/DL (ref 8.4–10.2)
CALCIUM SERPL-MCNC: 9.4 MG/DL (ref 8.4–10.2)
CHLORIDE SERPL-SCNC: 101 MMOL/L (ref 98–110)
CO2 SERPL-SCNC: 26 MMOL/L (ref 21–32)
COLOR UR AUTO: YELLOW
CREAT SERPL-MCNC: 1.12 MG/DL (ref 0.66–1.25)
CREAT UR-MCNC: 242.4 MG/DL (ref 45–106)
CREAT UR-MCNC: 262.4 MG/DL
CREAT/UREA NIT SERPL: 16 (ref 12–20)
EOSINOPHIL # BLD AUTO: 0.05 X10(3)/MCL (ref 0.04–0.36)
EOSINOPHIL NFR BLD AUTO: 0.5 % (ref 0.7–7)
ERYTHROCYTE [DISTWIDTH] IN BLOOD BY AUTOMATED COUNT: 13.1 % (ref 11–14.5)
GFR SERPLBLD CREATININE-BSD FMLA CKD-EPI: 53 MLS/MIN/1.73/M2
GLUCOSE SERPL-MCNC: 306 MG/DL (ref 70–115)
GLUCOSE UR QL STRIP.AUTO: 250
HCT VFR BLD AUTO: 31.1 % (ref 36–48)
HGB BLD-MCNC: 9.8 G/DL (ref 11.8–16)
IMM GRANULOCYTES # BLD AUTO: 0.06 X10(3)/MCL (ref 0–0.03)
IMM GRANULOCYTES NFR BLD AUTO: 0.6 % (ref 0–0.5)
KETONES UR QL STRIP.AUTO: NEGATIVE
LEUKOCYTE ESTERASE UR QL STRIP.AUTO: NEGATIVE
LYMPHOCYTES # BLD AUTO: 1.24 X10(3)/MCL (ref 1.16–3.74)
LYMPHOCYTES NFR BLD AUTO: 12.4 % (ref 20–55)
MCH RBC QN AUTO: 27.8 PG (ref 27–34)
MCHC RBC AUTO-ENTMCNC: 31.5 G/DL (ref 31–37)
MCV RBC AUTO: 88.1 FL (ref 79–99)
MICROALBUMIN UR-MCNC: 140.1 UG/ML
MICROALBUMIN/CREAT RATIO PNL UR: 57.8 MG/GM CR (ref 0–30)
MONOCYTES # BLD AUTO: 0.67 X10(3)/MCL (ref 0.24–0.36)
MONOCYTES NFR BLD AUTO: 6.7 % (ref 4.7–12.5)
NEUTROPHILS # BLD AUTO: 7.97 X10(3)/MCL (ref 1.56–6.13)
NEUTROPHILS NFR BLD AUTO: 79.5 % (ref 37–73)
NITRITE UR QL STRIP.AUTO: NEGATIVE
NRBC BLD AUTO-RTO: 0 %
PH UR STRIP.AUTO: 6 [PH]
PHOSPHATE SERPL-MCNC: 3.6 MG/DL (ref 2.5–4.9)
PLATELET # BLD AUTO: 245 X10(3)/MCL (ref 140–371)
PMV BLD AUTO: 10.8 FL (ref 9.4–12.4)
POTASSIUM SERPL-SCNC: 3.8 MMOL/L (ref 3.5–5.1)
PROT UR QL STRIP.AUTO: ABNORMAL
PROT UR STRIP-MCNC: 24 MG/DL
PTH-INTACT SERPL-MCNC: 41.5 PG/ML (ref 14–73)
RBC # BLD AUTO: 3.53 X10(6)/MCL (ref 4–5.1)
RBC UR QL AUTO: NEGATIVE
SODIUM SERPL-SCNC: 138 MMOL/L (ref 135–145)
SP GR UR STRIP.AUTO: >=1.03 (ref 1–1.03)
UROBILINOGEN UR STRIP-ACNC: 0.2
WBC # SPEC AUTO: 10.02 X10(3)/MCL (ref 4–11.5)

## 2023-10-13 PROCEDURE — 81003 URINALYSIS AUTO W/O SCOPE: CPT

## 2023-10-13 PROCEDURE — 85025 COMPLETE CBC W/AUTO DIFF WBC: CPT

## 2023-10-13 PROCEDURE — 82043 UR ALBUMIN QUANTITATIVE: CPT

## 2023-10-13 PROCEDURE — 83970 ASSAY OF PARATHORMONE: CPT

## 2023-10-13 PROCEDURE — 84156 ASSAY OF PROTEIN URINE: CPT

## 2023-10-13 PROCEDURE — 36415 COLL VENOUS BLD VENIPUNCTURE: CPT

## 2023-10-13 PROCEDURE — 80069 RENAL FUNCTION PANEL: CPT

## 2023-10-13 PROCEDURE — 82570 ASSAY OF URINE CREATININE: CPT

## 2023-10-27 ENCOUNTER — HOSPITAL ENCOUNTER (OUTPATIENT)
Dept: RADIOLOGY | Facility: HOSPITAL | Age: 68
Discharge: HOME | End: 2023-10-27
Attending: INTERNAL MEDICINE
Payer: MEDICARE

## 2023-10-27 DIAGNOSIS — I65.23 BILATERAL CAROTID ARTERY OCCLUSION: ICD-10-CM

## 2023-10-27 PROCEDURE — 25500020 PHARM REV CODE 255: Performed by: INTERNAL MEDICINE

## 2023-10-27 PROCEDURE — 70498 CT ANGIOGRAPHY NECK: CPT | Mod: TC

## 2023-10-27 RX ADMIN — IOPAMIDOL 100 ML: 755 INJECTION, SOLUTION INTRAVENOUS at 01:10

## 2024-02-16 ENCOUNTER — LAB VISIT (OUTPATIENT)
Dept: LAB | Facility: HOSPITAL | Age: 69
End: 2024-02-16
Attending: INTERNAL MEDICINE
Payer: MEDICARE

## 2024-02-16 DIAGNOSIS — E11.9 DIABETES MELLITUS WITHOUT COMPLICATION: ICD-10-CM

## 2024-02-16 DIAGNOSIS — K57.92 DIVERTICULITIS: ICD-10-CM

## 2024-02-16 DIAGNOSIS — I10 HYPERTENSION, UNSPECIFIED TYPE: Primary | ICD-10-CM

## 2024-02-16 DIAGNOSIS — I25.10 CORONARY ATHEROSCLEROSIS OF NATIVE CORONARY ARTERY: ICD-10-CM

## 2024-02-16 DIAGNOSIS — E55.9 AVITAMINOSIS D: ICD-10-CM

## 2024-02-16 LAB
ALBUMIN SERPL-MCNC: 4.6 G/DL (ref 3.4–5)
APPEARANCE UR: CLEAR
BASOPHILS # BLD AUTO: 0.04 X10(3)/MCL (ref 0.01–0.08)
BASOPHILS NFR BLD AUTO: 0.4 % (ref 0.1–1.2)
BILIRUB UR QL STRIP.AUTO: NEGATIVE
BUN SERPL-MCNC: 18 MG/DL (ref 7–20)
CALCIUM SERPL-MCNC: 9.7 MG/DL (ref 8.4–10.2)
CALCIUM SERPL-MCNC: 9.7 MG/DL (ref 8.4–10.2)
CHLORIDE SERPL-SCNC: 101 MMOL/L (ref 98–110)
CO2 SERPL-SCNC: 29 MMOL/L (ref 21–32)
COLOR UR AUTO: YELLOW
CREAT SERPL-MCNC: 1.37 MG/DL (ref 0.66–1.25)
CREAT UR-MCNC: 59.5 MG/DL (ref 45–106)
CREAT UR-MCNC: 65.2 MG/DL
CREAT/UREA NIT SERPL: 13 (ref 12–20)
EOSINOPHIL # BLD AUTO: 0.03 X10(3)/MCL (ref 0.04–0.36)
EOSINOPHIL NFR BLD AUTO: 0.3 % (ref 0.7–7)
ERYTHROCYTE [DISTWIDTH] IN BLOOD BY AUTOMATED COUNT: 15.9 % (ref 11–14.5)
FERRITIN SERPL-MCNC: 24 NG/ML (ref 6.24–264)
GFR SERPLBLD CREATININE-BSD FMLA CKD-EPI: 42 MLS/MIN/1.73/M2
GLUCOSE SERPL-MCNC: 318 MG/DL (ref 70–115)
GLUCOSE UR QL STRIP.AUTO: >=1000
HCT VFR BLD AUTO: 33.3 % (ref 36–48)
HGB BLD-MCNC: 10.3 G/DL (ref 11.8–16)
IMM GRANULOCYTES # BLD AUTO: 0.06 X10(3)/MCL (ref 0–0.03)
IMM GRANULOCYTES NFR BLD AUTO: 0.6 % (ref 0–0.5)
IRON SATN MFR SERPL: 8 % (ref 20–50)
IRON SERPL-MCNC: 26 UG/DL (ref 50–170)
KETONES UR QL STRIP.AUTO: NEGATIVE
LEUKOCYTE ESTERASE UR QL STRIP.AUTO: NEGATIVE
LYMPHOCYTES # BLD AUTO: 1.15 X10(3)/MCL (ref 1.16–3.74)
LYMPHOCYTES NFR BLD AUTO: 11.7 % (ref 20–55)
MCH RBC QN AUTO: 28.5 PG (ref 27–34)
MCHC RBC AUTO-ENTMCNC: 30.9 G/DL (ref 31–37)
MCV RBC AUTO: 92 FL (ref 79–99)
MICROALBUMIN UR-MCNC: 10 UG/ML
MICROALBUMIN/CREAT RATIO PNL UR: 16.8 MG/GM CR (ref 0–30)
MONOCYTES # BLD AUTO: 0.48 X10(3)/MCL (ref 0.24–0.36)
MONOCYTES NFR BLD AUTO: 4.9 % (ref 4.7–12.5)
NEUTROPHILS # BLD AUTO: 8.07 X10(3)/MCL (ref 1.56–6.13)
NEUTROPHILS NFR BLD AUTO: 82.1 % (ref 37–73)
NITRITE UR QL STRIP.AUTO: NEGATIVE
NRBC BLD AUTO-RTO: 0 %
PH UR STRIP.AUTO: 5.5 [PH]
PHOSPHATE SERPL-MCNC: 4.7 MG/DL (ref 2.5–4.9)
PLATELET # BLD AUTO: 266 X10(3)/MCL (ref 140–371)
PMV BLD AUTO: 10.8 FL (ref 9.4–12.4)
POTASSIUM SERPL-SCNC: 4.8 MMOL/L (ref 3.5–5.1)
PROT UR QL STRIP.AUTO: NEGATIVE
PROT UR STRIP-MCNC: 13 MG/DL
PTH-INTACT SERPL-MCNC: 71.3 PG/ML (ref 14–73)
RBC # BLD AUTO: 3.62 X10(6)/MCL (ref 4–5.1)
RBC UR QL AUTO: NEGATIVE
SODIUM SERPL-SCNC: 138 MMOL/L (ref 135–145)
SP GR UR STRIP.AUTO: 1.01 (ref 1–1.03)
TIBC SERPL-MCNC: 294 UG/DL (ref 70–310)
TIBC SERPL-MCNC: 320 UG/DL (ref 250–450)
TRANSFERRIN SERPL-MCNC: 313 MG/DL (ref 173–360)
UROBILINOGEN UR STRIP-ACNC: 0.2
WBC # SPEC AUTO: 9.83 X10(3)/MCL (ref 4–11.5)

## 2024-02-16 PROCEDURE — 80069 RENAL FUNCTION PANEL: CPT

## 2024-02-16 PROCEDURE — 82570 ASSAY OF URINE CREATININE: CPT

## 2024-02-16 PROCEDURE — 85025 COMPLETE CBC W/AUTO DIFF WBC: CPT

## 2024-02-16 PROCEDURE — 82043 UR ALBUMIN QUANTITATIVE: CPT

## 2024-02-16 PROCEDURE — 82728 ASSAY OF FERRITIN: CPT

## 2024-02-16 PROCEDURE — 83540 ASSAY OF IRON: CPT

## 2024-02-16 PROCEDURE — 36415 COLL VENOUS BLD VENIPUNCTURE: CPT

## 2024-02-16 PROCEDURE — 84156 ASSAY OF PROTEIN URINE: CPT

## 2024-02-16 PROCEDURE — 81003 URINALYSIS AUTO W/O SCOPE: CPT

## 2024-02-16 PROCEDURE — 83970 ASSAY OF PARATHORMONE: CPT

## 2024-06-19 ENCOUNTER — LAB VISIT (OUTPATIENT)
Dept: LAB | Facility: HOSPITAL | Age: 69
End: 2024-06-19
Attending: INTERNAL MEDICINE
Payer: MEDICARE

## 2024-06-19 DIAGNOSIS — E11.9 DIABETES MELLITUS WITHOUT COMPLICATION: Primary | ICD-10-CM

## 2024-06-19 DIAGNOSIS — E55.9 AVITAMINOSIS D: ICD-10-CM

## 2024-06-19 DIAGNOSIS — I10 ESSENTIAL HYPERTENSION, MALIGNANT: ICD-10-CM

## 2024-06-19 DIAGNOSIS — N18.31 CHRONIC KIDNEY DISEASE (CKD) STAGE G3A/A1, MODERATELY DECREASED GLOMERULAR FILTRATION RATE (GFR) BETWEEN 45-59 ML/MIN/1.73 SQUARE METER AND ALBUMINURIA CREATININE RATIO LESS THAN 30 MG/G: ICD-10-CM

## 2024-06-19 DIAGNOSIS — I25.10 CORONARY ATHEROSCLEROSIS OF NATIVE CORONARY ARTERY: ICD-10-CM

## 2024-06-19 LAB
ALBUMIN SERPL-MCNC: 4.2 G/DL (ref 3.4–5)
ANISOCYTOSIS BLD QL SMEAR: ABNORMAL
BASOPHILS # BLD AUTO: 0.04 X10(3)/MCL (ref 0.01–0.08)
BASOPHILS NFR BLD AUTO: 0.6 % (ref 0.1–1.2)
BUN SERPL-MCNC: 26 MG/DL (ref 7–20)
CALCIUM SERPL-MCNC: 10.6 MG/DL (ref 8.4–10.2)
CALCIUM SERPL-MCNC: 10.6 MG/DL (ref 8.4–10.2)
CHLORIDE SERPL-SCNC: 101 MMOL/L (ref 98–110)
CO2 SERPL-SCNC: 27 MMOL/L (ref 21–32)
CREAT SERPL-MCNC: 2.16 MG/DL (ref 0.66–1.25)
CREAT/UREA NIT SERPL: 12 (ref 12–20)
EOSINOPHIL # BLD AUTO: 0.04 X10(3)/MCL (ref 0.04–0.36)
EOSINOPHIL NFR BLD AUTO: 0.6 % (ref 0.7–7)
ERYTHROCYTE [DISTWIDTH] IN BLOOD BY AUTOMATED COUNT: 15.7 % (ref 11–14.5)
FERRITIN SERPL-MCNC: 14.9 NG/ML (ref 6.24–264)
GFR SERPLBLD CREATININE-BSD FMLA CKD-EPI: 24 ML/MIN/1.73/M2
GLUCOSE SERPL-MCNC: 255 MG/DL (ref 70–115)
HCT VFR BLD AUTO: 27.7 % (ref 36–48)
HGB BLD-MCNC: 7.7 G/DL (ref 11.8–16)
HYPOCHROMIA BLD QL SMEAR: ABNORMAL
IMM GRANULOCYTES # BLD AUTO: 0.03 X10(3)/MCL (ref 0–0.03)
IMM GRANULOCYTES NFR BLD AUTO: 0.5 % (ref 0–0.5)
IRON SATN MFR SERPL: 3 % (ref 20–50)
IRON SERPL-MCNC: 10 UG/DL (ref 50–170)
LYMPHOCYTES # BLD AUTO: 1.13 X10(3)/MCL (ref 1.16–3.74)
LYMPHOCYTES NFR BLD AUTO: 17.2 % (ref 20–55)
MCH RBC QN AUTO: 22.8 PG (ref 27–34)
MCHC RBC AUTO-ENTMCNC: 27.8 G/DL (ref 31–37)
MCV RBC AUTO: 82.2 FL (ref 79–99)
MONOCYTES # BLD AUTO: 0.55 X10(3)/MCL (ref 0.24–0.36)
MONOCYTES NFR BLD AUTO: 8.4 % (ref 4.7–12.5)
NEUTROPHILS # BLD AUTO: 4.78 X10(3)/MCL (ref 1.56–6.13)
NEUTROPHILS NFR BLD AUTO: 72.7 % (ref 37–73)
NRBC BLD AUTO-RTO: 0 %
PHOSPHATE SERPL-MCNC: 4.7 MG/DL (ref 2.5–4.9)
PLATELET # BLD AUTO: 365 X10(3)/MCL (ref 140–371)
PLATELET # BLD EST: ADEQUATE 10*3/UL
PMV BLD AUTO: 9.6 FL (ref 9.4–12.4)
POLYCHROMASIA BLD QL SMEAR: SLIGHT
POTASSIUM SERPL-SCNC: 4.8 MMOL/L (ref 3.5–5.1)
PTH-INTACT SERPL-MCNC: 24.3 PG/ML (ref 14–73)
RBC # BLD AUTO: 3.37 X10(6)/MCL (ref 4–5.1)
RBC MORPH BLD: ABNORMAL
SODIUM SERPL-SCNC: 139 MMOL/L (ref 136–145)
TIBC SERPL-MCNC: 349 UG/DL (ref 70–310)
TIBC SERPL-MCNC: 359 UG/DL (ref 250–450)
TRANSFERRIN SERPL-MCNC: 340 MG/DL (ref 173–360)
WBC # BLD AUTO: 6.57 X10(3)/MCL (ref 4–11.5)

## 2024-06-19 PROCEDURE — 82570 ASSAY OF URINE CREATININE: CPT | Performed by: INTERNAL MEDICINE

## 2024-06-19 PROCEDURE — 84156 ASSAY OF PROTEIN URINE: CPT | Performed by: INTERNAL MEDICINE

## 2024-06-19 PROCEDURE — 80069 RENAL FUNCTION PANEL: CPT

## 2024-06-19 PROCEDURE — 82728 ASSAY OF FERRITIN: CPT

## 2024-06-19 PROCEDURE — 81015 MICROSCOPIC EXAM OF URINE: CPT

## 2024-06-19 PROCEDURE — 83540 ASSAY OF IRON: CPT

## 2024-06-19 PROCEDURE — 85025 COMPLETE CBC W/AUTO DIFF WBC: CPT

## 2024-06-19 PROCEDURE — 81003 URINALYSIS AUTO W/O SCOPE: CPT

## 2024-06-19 PROCEDURE — 83970 ASSAY OF PARATHORMONE: CPT

## 2024-06-19 PROCEDURE — 87086 URINE CULTURE/COLONY COUNT: CPT | Performed by: INTERNAL MEDICINE

## 2024-06-19 PROCEDURE — 36415 COLL VENOUS BLD VENIPUNCTURE: CPT

## 2024-06-21 LAB
BACTERIA #/AREA URNS AUTO: ABNORMAL /HPF
BILIRUB UR QL STRIP.AUTO: NEGATIVE
CLARITY UR: ABNORMAL
COLOR UR AUTO: YELLOW
CREAT UR-MCNC: 35.7 MG/DL (ref 45–106)
CREAT UR-MCNC: 35.7 MG/DL (ref 45–106)
GLUCOSE UR QL STRIP: >=1000
HGB UR QL STRIP: ABNORMAL
KETONES UR QL STRIP: NEGATIVE
LEUKOCYTE ESTERASE UR QL STRIP: ABNORMAL
MICROALBUMIN UR-MCNC: 383.9 UG/ML
MICROALBUMIN/CREAT RATIO PNL UR: 1075.4 MG/GM CR (ref 0–30)
NITRITE UR QL STRIP: NEGATIVE
PH UR STRIP: 6.5 [PH]
PROT UR QL STRIP: 100
PROT UR STRIP-MCNC: 98.8 MG/DL
RBC #/AREA URNS AUTO: ABNORMAL /HPF
SP GR UR STRIP.AUTO: 1.01 (ref 1–1.03)
SQUAMOUS #/AREA URNS AUTO: ABNORMAL /HPF
URINE PROTEIN/CREATININE RATIO (OLG): 2.8
UROBILINOGEN UR STRIP-ACNC: 0.2
WBC #/AREA URNS AUTO: >100 /HPF

## 2024-06-24 LAB — BACTERIA UR CULT: ABNORMAL

## 2024-07-27 ENCOUNTER — HOSPITAL ENCOUNTER (EMERGENCY)
Facility: HOSPITAL | Age: 69
Discharge: HOME | End: 2024-07-28
Payer: MEDICARE

## 2024-07-27 DIAGNOSIS — A04.0 ENTERITIS, ENTEROPATHOGENIC E. COLI: Primary | ICD-10-CM

## 2024-07-27 DIAGNOSIS — R73.9 HYPERGLYCEMIA: ICD-10-CM

## 2024-07-27 LAB
ABS NEUT CALC (OHS): 7.64 X10(3)/MCL (ref 2.1–9.2)
ALBUMIN SERPL-MCNC: 4.4 G/DL (ref 3.4–5)
ALBUMIN/GLOB SERPL: 1.3 RATIO
ALP SERPL-CCNC: 96 UNIT/L (ref 50–144)
ALT SERPL-CCNC: 30 UNIT/L (ref 1–45)
ANION GAP SERPL CALC-SCNC: 18 MEQ/L (ref 2–13)
ANISOCYTOSIS BLD QL SMEAR: ABNORMAL
AST SERPL-CCNC: 46 UNIT/L (ref 14–36)
BILIRUB SERPL-MCNC: 0.4 MG/DL (ref 0–1)
BUN SERPL-MCNC: 42 MG/DL (ref 7–20)
CALCIUM SERPL-MCNC: 9.2 MG/DL (ref 8.4–10.2)
CHLORIDE SERPL-SCNC: 93 MMOL/L (ref 98–110)
CO2 SERPL-SCNC: 18 MMOL/L (ref 21–32)
CREAT SERPL-MCNC: 2.37 MG/DL (ref 0.66–1.25)
CREAT/UREA NIT SERPL: 18 (ref 12–20)
ELLIPTOCYTOSIS (OHS): ABNORMAL
EOSINOPHIL NFR BLD MANUAL: 0.09 X10(3)/MCL (ref 0–0.9)
EOSINOPHIL NFR BLD MANUAL: 1 % (ref 0–3)
ERYTHROCYTE [DISTWIDTH] IN BLOOD BY AUTOMATED COUNT: 15.2 % (ref 11–14.5)
GFR SERPLBLD CREATININE-BSD FMLA CKD-EPI: 22 ML/MIN/1.73/M2
GLOBULIN SER-MCNC: 3.5 GM/DL (ref 2–3.9)
GLUCOSE SERPL-MCNC: 480 MG/DL (ref 70–115)
HCT VFR BLD AUTO: 27.3 % (ref 36–48)
HGB BLD-MCNC: 7.7 G/DL (ref 11.8–16)
LYMPHOCYTES NFR BLD MANUAL: 0.37 X10(3)/MCL
LYMPHOCYTES NFR BLD MANUAL: 4 % (ref 20–55)
MAGNESIUM SERPL-MCNC: 1.8 MG/DL (ref 1.8–2.4)
MCH RBC QN AUTO: 22.4 PG (ref 27–34)
MCHC RBC AUTO-ENTMCNC: 28.2 G/DL (ref 31–37)
MCV RBC AUTO: 79.4 FL (ref 79–99)
MONOCYTES NFR BLD MANUAL: 1.11 X10(3)/MCL (ref 0.1–1.3)
MONOCYTES NFR BLD MANUAL: 12 % (ref 0–10)
NEUTROPHILS NFR BLD MANUAL: 57 % (ref 37–73)
NEUTS BAND NFR BLD MANUAL: 26 % (ref 0–5)
NRBC BLD AUTO-RTO: 0 %
PLATELET # BLD AUTO: 339 X10(3)/MCL (ref 140–371)
PLATELET # BLD EST: NORMAL 10*3/UL
PMV BLD AUTO: 10.8 FL (ref 9.4–12.4)
POCT GLUCOSE: 376 MG/DL (ref 70–110)
POCT GLUCOSE: 437 MG/DL (ref 70–110)
POIKILOCYTOSIS BLD QL SMEAR: ABNORMAL
POTASSIUM SERPL-SCNC: 4.1 MMOL/L (ref 3.5–5.1)
PROT SERPL-MCNC: 7.9 GM/DL (ref 6.3–8.2)
RBC # BLD AUTO: 3.44 X10(6)/MCL (ref 4–5.1)
RBC MORPH BLD: ABNORMAL
SODIUM SERPL-SCNC: 129 MMOL/L (ref 136–145)
WBC # BLD AUTO: 9.21 X10(3)/MCL (ref 4–11.5)

## 2024-07-27 PROCEDURE — 83735 ASSAY OF MAGNESIUM: CPT

## 2024-07-27 PROCEDURE — 96374 THER/PROPH/DIAG INJ IV PUSH: CPT

## 2024-07-27 PROCEDURE — 87507 IADNA-DNA/RNA PROBE TQ 12-25: CPT

## 2024-07-27 PROCEDURE — 99284 EMERGENCY DEPT VISIT MOD MDM: CPT | Mod: 25

## 2024-07-27 PROCEDURE — 80053 COMPREHEN METABOLIC PANEL: CPT

## 2024-07-27 PROCEDURE — 96375 TX/PRO/DX INJ NEW DRUG ADDON: CPT

## 2024-07-27 PROCEDURE — 63600175 PHARM REV CODE 636 W HCPCS

## 2024-07-27 PROCEDURE — 96361 HYDRATE IV INFUSION ADD-ON: CPT

## 2024-07-27 PROCEDURE — 82962 GLUCOSE BLOOD TEST: CPT

## 2024-07-27 PROCEDURE — 85007 BL SMEAR W/DIFF WBC COUNT: CPT

## 2024-07-27 PROCEDURE — 25000003 PHARM REV CODE 250

## 2024-07-27 RX ORDER — PROCHLORPERAZINE EDISYLATE 5 MG/ML
5 INJECTION INTRAMUSCULAR; INTRAVENOUS
Status: COMPLETED | OUTPATIENT
Start: 2024-07-27 | End: 2024-07-27

## 2024-07-27 RX ORDER — FAMOTIDINE 10 MG/ML
20 INJECTION INTRAVENOUS
Status: COMPLETED | OUTPATIENT
Start: 2024-07-27 | End: 2024-07-27

## 2024-07-27 RX ORDER — SODIUM CHLORIDE 9 MG/ML
1000 INJECTION, SOLUTION INTRAVENOUS
Status: DISCONTINUED | OUTPATIENT
Start: 2024-07-27 | End: 2024-07-27

## 2024-07-27 RX ADMIN — SODIUM CHLORIDE 1000 ML: 9 INJECTION, SOLUTION INTRAVENOUS at 09:07

## 2024-07-27 RX ADMIN — PROCHLORPERAZINE EDISYLATE 5 MG: 5 INJECTION INTRAMUSCULAR; INTRAVENOUS at 09:07

## 2024-07-27 RX ADMIN — FAMOTIDINE 20 MG: 10 INJECTION, SOLUTION INTRAVENOUS at 09:07

## 2024-07-27 RX ADMIN — INSULIN HUMAN 8 UNITS: 100 INJECTION, SOLUTION PARENTERAL at 10:07

## 2024-07-28 VITALS
DIASTOLIC BLOOD PRESSURE: 52 MMHG | SYSTOLIC BLOOD PRESSURE: 138 MMHG | TEMPERATURE: 98 F | BODY MASS INDEX: 18.22 KG/M2 | RESPIRATION RATE: 16 BRPM | HEIGHT: 62 IN | HEART RATE: 83 BPM | OXYGEN SATURATION: 98 % | WEIGHT: 99 LBS

## 2024-07-28 PROBLEM — R73.9 HYPERGLYCEMIA: Status: ACTIVE | Noted: 2024-07-28

## 2024-07-28 PROBLEM — A04.0 ENTERITIS, ENTEROPATHOGENIC E. COLI: Status: ACTIVE | Noted: 2024-07-28

## 2024-07-28 LAB
ADV 40+41 DNA STL QL NAA+NON-PROBE: NOT DETECTED
ASTRO TYP 1-8 RNA STL QL NAA+NON-PROBE: NOT DETECTED
C CAYETANENSIS DNA STL QL NAA+NON-PROBE: NOT DETECTED
C COLI+JEJ+UPSA DNA STL QL NAA+NON-PROBE: NOT DETECTED
CONSISTENCY STL: ABNORMAL
CRYPTOSP DNA STL QL NAA+NON-PROBE: NOT DETECTED
E COLI O157 DNA STL QL NAA+NON-PROBE: ABNORMAL
E HISTOLYT DNA STL QL NAA+NON-PROBE: NOT DETECTED
EAEC PAA PLAS AGGR+AATA ST NAA+NON-PRB: NOT DETECTED
EC STX1+STX2 GENES STL QL NAA+NON-PROBE: NOT DETECTED
EPEC EAE GENE STL QL NAA+NON-PROBE: DETECTED
ETEC LTA+ST1A+ST1B TOX ST NAA+NON-PROBE: NOT DETECTED
G LAMBLIA DNA STL QL NAA+NON-PROBE: NOT DETECTED
NOROVIRUS GI+II RNA STL QL NAA+NON-PROBE: NOT DETECTED
P SHIGELLOIDES DNA STL QL NAA+NON-PROBE: NOT DETECTED
POCT GLUCOSE: 376 MG/DL (ref 70–110)
RVA RNA STL QL NAA+NON-PROBE: NOT DETECTED
S ENT+BONG DNA STL QL NAA+NON-PROBE: NOT DETECTED
SAPO I+II+IV+V RNA STL QL NAA+NON-PROBE: NOT DETECTED
SHIGELLA SP+EIEC IPAH ST NAA+NON-PROBE: NOT DETECTED
V CHOL+PARA+VUL DNA STL QL NAA+NON-PROBE: NOT DETECTED
V CHOLERAE DNA STL QL NAA+NON-PROBE: NOT DETECTED
Y ENTEROCOL DNA STL QL NAA+NON-PROBE: NOT DETECTED

## 2024-07-28 PROCEDURE — 63600175 PHARM REV CODE 636 W HCPCS

## 2024-07-28 PROCEDURE — 82962 GLUCOSE BLOOD TEST: CPT

## 2024-07-28 PROCEDURE — 25000003 PHARM REV CODE 250

## 2024-07-28 PROCEDURE — 96376 TX/PRO/DX INJ SAME DRUG ADON: CPT

## 2024-07-28 RX ORDER — CIPROFLOXACIN 500 MG/1
500 TABLET ORAL
Status: COMPLETED | OUTPATIENT
Start: 2024-07-28 | End: 2024-07-28

## 2024-07-28 RX ORDER — PROCHLORPERAZINE MALEATE 10 MG
10 TABLET ORAL EVERY 6 HOURS PRN
Qty: 15 TABLET | Refills: 0 | Status: SHIPPED | OUTPATIENT
Start: 2024-07-28

## 2024-07-28 RX ORDER — CIPROFLOXACIN 500 MG/1
500 TABLET ORAL 2 TIMES DAILY
Qty: 6 TABLET | Refills: 0 | Status: SHIPPED | OUTPATIENT
Start: 2024-07-28 | End: 2024-07-31

## 2024-07-28 RX ADMIN — INSULIN HUMAN 8 UNITS: 100 INJECTION, SOLUTION PARENTERAL at 12:07

## 2024-07-28 RX ADMIN — CIPROFLOXACIN 500 MG: 500 TABLET, FILM COATED ORAL at 12:07

## 2024-07-28 NOTE — ED PROVIDER NOTES
Encounter Date: 7/27/2024       History     Chief Complaint   Patient presents with    Emesis    Diarrhea     Diarrheas started yesterday, vomiting started today.     Abdominal Pain     68-year-old female presents complaining of nausea, vomiting and diarrhea.  These symptoms began yesterday.  She was exposed to a young boy who had similar symptoms 2 days ago.  She denies any fever or chills.  She has not been on antibiotics any time recently.    The history is provided by the patient and a relative.     Review of patient's allergies indicates:   Allergen Reactions    Hydrocodone Rash     Past Medical History:   Diagnosis Date    Allergy     Clotting disorder     Dementia     Diabetes mellitus     GERD (gastroesophageal reflux disease)     Glaucoma     Hypertension     Stroke      Past Surgical History:   Procedure Laterality Date    OOPHORECTOMY      Pace Maker Right     PARTIAL HYSTERECTOMY      stents      x5    WRIST FRACTURE SURGERY Bilateral      Family History   Problem Relation Name Age of Onset    Heart disease Father      Heart disease Mother      Diabetes Mother      Lupus Sister       Social History     Tobacco Use    Smoking status: Every Day     Current packs/day: 0.50     Types: Cigarettes    Smokeless tobacco: Never   Substance Use Topics    Alcohol use: Not Currently    Drug use: Not Currently     Review of Systems   Constitutional:  Negative for fever.   HENT:  Negative for sore throat.    Respiratory:  Negative for shortness of breath.    Cardiovascular:  Negative for chest pain.   Gastrointestinal:  Positive for diarrhea, nausea and vomiting.   Genitourinary:  Negative for dysuria.   Musculoskeletal:  Negative for back pain.   Skin:  Negative for rash.   Neurological:  Negative for weakness.   Hematological:  Does not bruise/bleed easily.   All other systems reviewed and are negative.      Physical Exam     Initial Vitals [07/27/24 2052]   BP Pulse Resp Temp SpO2   (!) 100/46 84 16 98.1 °F (36.7  °C) 100 %      MAP       --         Physical Exam    Nursing note and vitals reviewed.  Constitutional: Vital signs are normal. She appears well-developed and well-nourished. She is cooperative.   HENT:   Head: Normocephalic and atraumatic.   Nose: Nose normal.   Mucous membranes are tacky   Eyes: Conjunctivae, EOM and lids are normal. Pupils are equal, round, and reactive to light.   Neck: Trachea normal. Neck supple.   Normal range of motion.  Cardiovascular:  Normal rate, regular rhythm, normal heart sounds and intact distal pulses.           Pulmonary/Chest: Breath sounds normal.   Abdominal: Abdomen is soft. Bowel sounds are normal. She exhibits no distension and no mass. There is no abdominal tenderness. There is no rebound and no guarding.   Musculoskeletal:         General: Normal range of motion.      Cervical back: Normal, normal range of motion and neck supple.      Lumbar back: Normal.     Neurological: She is alert and oriented to person, place, and time. She has normal strength. Coordination normal. GCS score is 15. GCS eye subscore is 4. GCS verbal subscore is 5. GCS motor subscore is 6.   Skin: Skin is warm, dry and intact. Capillary refill takes less than 2 seconds.   Psychiatric: She has a normal mood and affect. Her speech is normal and behavior is normal. Judgment and thought content normal. Cognition and memory are normal.         ED Course   Procedures  Labs Reviewed   COMPREHENSIVE METABOLIC PANEL - Abnormal       Result Value    Sodium 129 (*)     Potassium 4.1      Chloride 93 (*)     CO2 18 (*)     Glucose 480 (*)     Blood Urea Nitrogen 42 (*)     Creatinine 2.37 (*)     Calcium 9.2      Protein Total 7.9      Albumin 4.4      Globulin 3.5      Albumin/Globulin Ratio 1.3      Bilirubin Total 0.4      ALP 96      ALT 30      AST 46 (*)     eGFR 22      Anion Gap 18.0 (*)     BUN/Creatinine Ratio 18     GI PANEL - Abnormal    Stool Consistancy liquid      CAMPYLOBACTER Not Detected       PLESIOMONAS SHIGELLOIDES Not Detected      SALMONELLA Not Detected      Vibrio sp. Not Detected      VIBRIO CHOLERAE Not Detected      YERSINIA ENTEROCOLITICA Not Detected      ENTEROAGGREGATIVE E. COLI (EAEC) Not Detected      ENTEROPATHOGENIC E. COLI (EPEC) Detected (*)     Enterotoxigenic E. coli (ETEC) Not Detected      SHIGA-LIKE TOXIN-PRODUCING E. COLI (STEC) Not Detected      E. COLI O157        Shigella/Enteroinvasive E. coli (EIEC) Not Detected      CRYPTOSPORIDIUM Not Detected      Cyclospora cayetanensis Not Detected      Entamoeba histolytica Not Detected      Giardia lamblia Not Detected      Adenovirus F 40/41 Not Detected      Astrovirus Not Detected      Norovirus GI/GII Not Detected      Rotavirus A Not Detected      Sapovirus Not Detected      Narrative:     The View and Chew GI Panel is a multiplexed nucleic acid test intended for use with the View and Chew® MonCV.com®, MonCV.com® 2.0, or MonCV.com® MedSocket Systems for the simultaneous qualitative detection and identification of nucleic acids from multiple bacteria, viruses, and parasites directly from stool samples in Junie Paco transport medium obtained from individuals with signs and/or symptoms of gastrointestinal infection.   CBC WITH DIFFERENTIAL - Abnormal    WBC 9.21      RBC 3.44 (*)     Hgb 7.7 (*)     Hct 27.3 (*)     MCV 79.4      MCH 22.4 (*)     MCHC 28.2 (*)     RDW 15.2 (*)     Platelet 339      MPV 10.8      NRBC% 0.0     MANUAL DIFFERENTIAL - Abnormal    Neutrophils % 57      Bands % 26 (*)     Lymphs % 4 (*)     Monocytes % 12 (*)     Eosinophils % 1      Neutrophils Abs Calc 7.6443      Lymphs Abs 0.3684 (*)     Eosinophils Abs 0.0921      Monocytes Abs 1.1052      Platelets Normal      RBC Morph Abnormal (*)     Anisocytosis 1+ (*)     Poikilocytosis 1+ (*)     Elliptocytosis 1+ (*)    POCT GLUCOSE - Abnormal    POCT Glucose 437 (*)    POCT GLUCOSE - Abnormal    POCT Glucose 376 (*)    MAGNESIUM - Normal    Magnesium Level 1.80     CBC W/  AUTO DIFFERENTIAL    Narrative:     The following orders were created for panel order CBC auto differential.  Procedure                               Abnormality         Status                     ---------                               -----------         ------                     CBC with Differential[[id number]]       Abnormal            Final result               Manual Differential[[id number]]         Abnormal            Final result                 Please view results for these tests on the individual orders.   POCT GLUCOSE, HAND-HELD DEVICE          Imaging Results    None          Medications   insulin regular injection 8 Units 0.08 mL (has no administration in time range)   ciprofloxacin HCl tablet 500 mg (has no administration in time range)   sodium chloride 0.9% bolus 1,000 mL 1,000 mL (0 mLs Intravenous Stopped 7/27/24 2332)   prochlorperazine injection Soln 5 mg (5 mg Intravenous Given 7/27/24 2139)   famotidine (PF) injection 20 mg (20 mg Intravenous Given 7/27/24 2139)   insulin regular injection 8 Units 0.08 mL (8 Units Intravenous Given 7/27/24 2242)     Medical Decision Making  Gastroenteritis  Differential diagnosis: Viral gastroenteritis, bacterial gastroenteritis, dehydration, electrolyte imbalance  IV fluids, antiemetics, Pepcid  Labs    Amount and/or Complexity of Data Reviewed  Labs: ordered. Decision-making details documented in ED Course.    Risk  OTC drugs.  Prescription drug management.               ED Course as of 07/28/24 0009   Sat Jul 27, 2024 2156 CBC auto differential(!)  There is no leukocytosis, H and H unchanged versus a month ago. [TM]   2217 Comprehensive metabolic panel(!)  Hyperglycemia, BUN and creatinine are slightly above baseline. [TM]      ED Course User Index  [TM] Papito Sampson MD                           Clinical Impression:  Final diagnoses:  [A04.0] Enteritis, enteropathogenic E. coli (Primary)  [R73.9] Hyperglycemia          ED Disposition Condition     Discharge Good          ED Prescriptions       Medication Sig Dispense Start Date End Date Auth. Provider    prochlorperazine (COMPAZINE) 10 MG tablet Take 1 tablet (10 mg total) by mouth every 6 (six) hours as needed. 15 tablet 7/28/2024 -- Papito Sampson MD    ciprofloxacin HCl (CIPRO) 500 MG tablet Take 1 tablet (500 mg total) by mouth 2 (two) times daily. for 3 days 6 tablet 7/28/2024 7/31/2024 Papito Sampson MD          Follow-up Information       Follow up With Specialties Details Why Contact Info    Roger Duenas III, MD Family Medicine Call in 1 day  [address] [facility]  [place] [address]  [phone]               Papito Sampson MD  07/28/24 0009       Papito Sampson MD  07/28/24 0009

## 2024-08-06 ENCOUNTER — HOSPITAL ENCOUNTER (INPATIENT)
Facility: HOSPITAL | Age: 69
LOS: 6 days | Discharge: HOME HEALTH | DRG: 372 | End: 2024-08-12
Attending: SURGERY | Admitting: FAMILY MEDICINE
Payer: MEDICARE

## 2024-08-06 DIAGNOSIS — E87.6 HYPOKALEMIA: ICD-10-CM

## 2024-08-06 DIAGNOSIS — E87.20 METABOLIC ACIDOSIS: ICD-10-CM

## 2024-08-06 DIAGNOSIS — D50.9 HYPOCHROMIC MICROCYTIC ANEMIA: ICD-10-CM

## 2024-08-06 DIAGNOSIS — F17.200 TOBACCO DEPENDENCY: ICD-10-CM

## 2024-08-06 DIAGNOSIS — R19.8: ICD-10-CM

## 2024-08-06 DIAGNOSIS — K92.2 GASTROINTESTINAL HEMORRHAGE, UNSPECIFIED GASTROINTESTINAL HEMORRHAGE TYPE: Primary | ICD-10-CM

## 2024-08-06 DIAGNOSIS — N18.9 CHRONIC RENAL FAILURE, UNSPECIFIED CKD STAGE: ICD-10-CM

## 2024-08-06 DIAGNOSIS — A09 INFECTIOUS GASTROENTERITIS: ICD-10-CM

## 2024-08-06 LAB
ABO AND RH: NORMAL
ABORH RETYPE: NORMAL
ALBUMIN SERPL-MCNC: 3.7 G/DL (ref 3.4–5)
ALBUMIN/GLOB SERPL: 1 RATIO
ALP SERPL-CCNC: 109 UNIT/L (ref 50–144)
ALT SERPL-CCNC: 19 UNIT/L (ref 1–45)
ANION GAP SERPL CALC-SCNC: 12 MEQ/L (ref 2–13)
ANTIBODY SCREEN: NORMAL
AST SERPL-CCNC: 29 UNIT/L (ref 14–36)
BACTERIA #/AREA URNS AUTO: ABNORMAL /HPF
BASOPHILS # BLD AUTO: 0.04 X10(3)/MCL (ref 0.01–0.08)
BASOPHILS NFR BLD AUTO: 0.2 % (ref 0.1–1.2)
BILIRUB SERPL-MCNC: 0.2 MG/DL (ref 0–1)
BILIRUB UR QL STRIP.AUTO: NEGATIVE
BUN SERPL-MCNC: 36 MG/DL (ref 7–20)
CALCIUM SERPL-MCNC: 8.8 MG/DL (ref 8.4–10.2)
CHLORIDE SERPL-SCNC: 104 MMOL/L (ref 98–110)
CLARITY UR: CLEAR
CO2 SERPL-SCNC: 18 MMOL/L (ref 21–32)
COLOR UR AUTO: YELLOW
CREAT SERPL-MCNC: 2.33 MG/DL (ref 0.66–1.25)
CREAT/UREA NIT SERPL: 15 (ref 12–20)
EOSINOPHIL # BLD AUTO: 0.04 X10(3)/MCL (ref 0.04–0.36)
EOSINOPHIL NFR BLD AUTO: 0.2 % (ref 0.7–7)
ERYTHROCYTE [DISTWIDTH] IN BLOOD BY AUTOMATED COUNT: 15.4 % (ref 11–14.5)
GFR SERPLBLD CREATININE-BSD FMLA CKD-EPI: 22 ML/MIN/1.73/M2
GLOBULIN SER-MCNC: 3.7 GM/DL (ref 2–3.9)
GLUCOSE SERPL-MCNC: 110 MG/DL (ref 70–115)
GLUCOSE UR QL STRIP: 500
HCT VFR BLD AUTO: 23.1 % (ref 36–48)
HGB BLD-MCNC: 6.7 G/DL (ref 11.8–16)
HGB UR QL STRIP: ABNORMAL
IMM GRANULOCYTES # BLD AUTO: 0.17 X10(3)/MCL (ref 0–0.03)
IMM GRANULOCYTES NFR BLD AUTO: 1 % (ref 0–0.5)
KETONES UR QL STRIP: NEGATIVE
LEUKOCYTE ESTERASE UR QL STRIP: ABNORMAL
LIPASE SERPL-CCNC: 203 U/L (ref 23–300)
LYMPHOCYTES # BLD AUTO: 0.89 X10(3)/MCL (ref 1.16–3.74)
LYMPHOCYTES NFR BLD AUTO: 5.3 % (ref 20–55)
MCH RBC QN AUTO: 22.1 PG (ref 27–34)
MCHC RBC AUTO-ENTMCNC: 29 G/DL (ref 31–37)
MCV RBC AUTO: 76.2 FL (ref 79–99)
MONOCYTES # BLD AUTO: 0.79 X10(3)/MCL (ref 0.24–0.36)
MONOCYTES NFR BLD AUTO: 4.7 % (ref 4.7–12.5)
NEUTROPHILS # BLD AUTO: 14.95 X10(3)/MCL (ref 1.56–6.13)
NEUTROPHILS NFR BLD AUTO: 88.6 % (ref 37–73)
NITRITE UR QL STRIP: NEGATIVE
NRBC BLD AUTO-RTO: 0 %
PH UR STRIP: 6 [PH]
PLATELET # BLD AUTO: 328 X10(3)/MCL (ref 140–371)
PMV BLD AUTO: 9.7 FL (ref 9.4–12.4)
POTASSIUM SERPL-SCNC: 2.9 MMOL/L (ref 3.5–5.1)
PROT SERPL-MCNC: 7.4 GM/DL (ref 6.3–8.2)
PROT UR QL STRIP: 100
RBC # BLD AUTO: 3.03 X10(6)/MCL (ref 4–5.1)
RBC #/AREA URNS AUTO: ABNORMAL /HPF
SODIUM SERPL-SCNC: 134 MMOL/L (ref 136–145)
SP GR UR STRIP.AUTO: 1.01 (ref 1–1.03)
SPECIMEN OUTDATE: NORMAL
SQUAMOUS #/AREA URNS AUTO: ABNORMAL /HPF
UROBILINOGEN UR STRIP-ACNC: 0.2
WBC # BLD AUTO: 16.88 X10(3)/MCL (ref 4–11.5)
WBC #/AREA URNS AUTO: >100 /HPF

## 2024-08-06 PROCEDURE — 25000003 PHARM REV CODE 250: Performed by: NURSE PRACTITIONER

## 2024-08-06 PROCEDURE — 30233N1 TRANSFUSION OF NONAUTOLOGOUS RED BLOOD CELLS INTO PERIPHERAL VEIN, PERCUTANEOUS APPROACH: ICD-10-PCS | Performed by: SURGERY

## 2024-08-06 PROCEDURE — 21400001 HC TELEMETRY ROOM

## 2024-08-06 PROCEDURE — 86900 BLOOD TYPING SEROLOGIC ABO: CPT | Performed by: NURSE PRACTITIONER

## 2024-08-06 PROCEDURE — 80053 COMPREHEN METABOLIC PANEL: CPT | Performed by: NURSE PRACTITIONER

## 2024-08-06 PROCEDURE — 96367 TX/PROPH/DG ADDL SEQ IV INF: CPT

## 2024-08-06 PROCEDURE — 85025 COMPLETE CBC W/AUTO DIFF WBC: CPT | Performed by: NURSE PRACTITIONER

## 2024-08-06 PROCEDURE — 87086 URINE CULTURE/COLONY COUNT: CPT | Performed by: NURSE PRACTITIONER

## 2024-08-06 PROCEDURE — 63600175 PHARM REV CODE 636 W HCPCS: Performed by: INTERNAL MEDICINE

## 2024-08-06 PROCEDURE — 96365 THER/PROPH/DIAG IV INF INIT: CPT

## 2024-08-06 PROCEDURE — 81003 URINALYSIS AUTO W/O SCOPE: CPT | Performed by: NURSE PRACTITIONER

## 2024-08-06 PROCEDURE — 36415 COLL VENOUS BLD VENIPUNCTURE: CPT | Performed by: NURSE PRACTITIONER

## 2024-08-06 PROCEDURE — 83690 ASSAY OF LIPASE: CPT | Performed by: NURSE PRACTITIONER

## 2024-08-06 PROCEDURE — 86901 BLOOD TYPING SEROLOGIC RH(D): CPT | Performed by: NURSE PRACTITIONER

## 2024-08-06 PROCEDURE — 81015 MICROSCOPIC EXAM OF URINE: CPT | Performed by: NURSE PRACTITIONER

## 2024-08-06 PROCEDURE — 96368 THER/DIAG CONCURRENT INF: CPT

## 2024-08-06 PROCEDURE — 86923 COMPATIBILITY TEST ELECTRIC: CPT | Performed by: SURGERY

## 2024-08-06 PROCEDURE — 25000003 PHARM REV CODE 250: Performed by: SURGERY

## 2024-08-06 PROCEDURE — 63600175 PHARM REV CODE 636 W HCPCS: Performed by: NURSE PRACTITIONER

## 2024-08-06 PROCEDURE — 63600175 PHARM REV CODE 636 W HCPCS: Performed by: SURGERY

## 2024-08-06 PROCEDURE — 96375 TX/PRO/DX INJ NEW DRUG ADDON: CPT

## 2024-08-06 PROCEDURE — 87077 CULTURE AEROBIC IDENTIFY: CPT | Performed by: NURSE PRACTITIONER

## 2024-08-06 PROCEDURE — 99285 EMERGENCY DEPT VISIT HI MDM: CPT | Mod: 25

## 2024-08-06 PROCEDURE — 11000001 HC ACUTE MED/SURG PRIVATE ROOM

## 2024-08-06 PROCEDURE — 96366 THER/PROPH/DIAG IV INF ADDON: CPT

## 2024-08-06 PROCEDURE — 87186 SC STD MICRODIL/AGAR DIL: CPT | Performed by: NURSE PRACTITIONER

## 2024-08-06 PROCEDURE — 25000003 PHARM REV CODE 250: Performed by: INTERNAL MEDICINE

## 2024-08-06 PROCEDURE — 86850 RBC ANTIBODY SCREEN: CPT | Performed by: NURSE PRACTITIONER

## 2024-08-06 RX ORDER — ENALAPRIL MALEATE 10 MG/1
10 TABLET ORAL DAILY
COMMUNITY

## 2024-08-06 RX ORDER — POTASSIUM CHLORIDE 7.45 MG/ML
10 INJECTION INTRAVENOUS
Status: COMPLETED | OUTPATIENT
Start: 2024-08-06 | End: 2024-08-06

## 2024-08-06 RX ORDER — POTASSIUM CHLORIDE 20 MEQ/1
40 TABLET, EXTENDED RELEASE ORAL ONCE
Status: COMPLETED | OUTPATIENT
Start: 2024-08-06 | End: 2024-08-06

## 2024-08-06 RX ORDER — DIPHENOXYLATE HYDROCHLORIDE AND ATROPINE SULFATE 2.5; .025 MG/1; MG/1
1 TABLET ORAL EVERY 6 HOURS PRN
COMMUNITY
End: 2024-08-06 | Stop reason: SINTOL

## 2024-08-06 RX ORDER — HYDROCODONE BITARTRATE AND ACETAMINOPHEN 500; 5 MG/1; MG/1
TABLET ORAL
Status: DISCONTINUED | OUTPATIENT
Start: 2024-08-06 | End: 2024-08-12 | Stop reason: HOSPADM

## 2024-08-06 RX ORDER — METOPROLOL TARTRATE 25 MG/1
25 TABLET, FILM COATED ORAL 2 TIMES DAILY
Status: DISCONTINUED | OUTPATIENT
Start: 2024-08-07 | End: 2024-08-12 | Stop reason: HOSPADM

## 2024-08-06 RX ORDER — LINAGLIPTIN 5 MG/1
5 TABLET, FILM COATED ORAL DAILY
COMMUNITY

## 2024-08-06 RX ORDER — MONTELUKAST SODIUM 10 MG/1
10 TABLET ORAL NIGHTLY
Status: DISCONTINUED | OUTPATIENT
Start: 2024-08-06 | End: 2024-08-12 | Stop reason: HOSPADM

## 2024-08-06 RX ORDER — ATORVASTATIN CALCIUM 40 MG/1
40 TABLET, FILM COATED ORAL NIGHTLY
Status: DISCONTINUED | OUTPATIENT
Start: 2024-08-06 | End: 2024-08-12 | Stop reason: HOSPADM

## 2024-08-06 RX ORDER — IBUPROFEN 200 MG
16 TABLET ORAL
Status: DISCONTINUED | OUTPATIENT
Start: 2024-08-06 | End: 2024-08-12 | Stop reason: HOSPADM

## 2024-08-06 RX ORDER — DONEPEZIL HYDROCHLORIDE 10 MG/1
10 TABLET, FILM COATED ORAL NIGHTLY
COMMUNITY

## 2024-08-06 RX ORDER — FAMOTIDINE 10 MG/ML
20 INJECTION INTRAVENOUS DAILY
Status: DISCONTINUED | OUTPATIENT
Start: 2024-08-07 | End: 2024-08-12 | Stop reason: HOSPADM

## 2024-08-06 RX ORDER — FERROUS GLUCONATE 324(37.5)
324 TABLET ORAL
Status: DISCONTINUED | OUTPATIENT
Start: 2024-08-07 | End: 2024-08-12 | Stop reason: HOSPADM

## 2024-08-06 RX ORDER — INSULIN ASPART 100 [IU]/ML
INJECTION, SOLUTION INTRAVENOUS; SUBCUTANEOUS
COMMUNITY

## 2024-08-06 RX ORDER — SODIUM BICARBONATE 650 MG/1
650 TABLET ORAL 3 TIMES DAILY
Status: DISCONTINUED | OUTPATIENT
Start: 2024-08-07 | End: 2024-08-12 | Stop reason: HOSPADM

## 2024-08-06 RX ORDER — GLUCAGON 1 MG
1 KIT INJECTION
Status: DISCONTINUED | OUTPATIENT
Start: 2024-08-06 | End: 2024-08-12 | Stop reason: HOSPADM

## 2024-08-06 RX ORDER — POTASSIUM CHLORIDE 20 MEQ/1
20 TABLET, EXTENDED RELEASE ORAL ONCE
Status: COMPLETED | OUTPATIENT
Start: 2024-08-07 | End: 2024-08-07

## 2024-08-06 RX ORDER — IBUPROFEN 200 MG
24 TABLET ORAL
Status: DISCONTINUED | OUTPATIENT
Start: 2024-08-06 | End: 2024-08-12 | Stop reason: HOSPADM

## 2024-08-06 RX ORDER — ACETAMINOPHEN AND CODEINE PHOSPHATE 300; 30 MG/1; MG/1
1 TABLET ORAL
Status: COMPLETED | OUTPATIENT
Start: 2024-08-06 | End: 2024-08-06

## 2024-08-06 RX ORDER — ACETAMINOPHEN 325 MG/1
650 TABLET ORAL EVERY 6 HOURS PRN
Status: DISCONTINUED | OUTPATIENT
Start: 2024-08-06 | End: 2024-08-12 | Stop reason: HOSPADM

## 2024-08-06 RX ORDER — CHOLECALCIFEROL (VITAMIN D3) 25 MCG
1000 TABLET ORAL DAILY
Status: DISCONTINUED | OUTPATIENT
Start: 2024-08-07 | End: 2024-08-12 | Stop reason: HOSPADM

## 2024-08-06 RX ORDER — INDOMETHACIN 25 MG/1
50 CAPSULE ORAL ONCE
Status: COMPLETED | OUTPATIENT
Start: 2024-08-06 | End: 2024-08-06

## 2024-08-06 RX ORDER — INSULIN ASPART 100 [IU]/ML
0-5 INJECTION, SOLUTION INTRAVENOUS; SUBCUTANEOUS
Status: DISCONTINUED | OUTPATIENT
Start: 2024-08-06 | End: 2024-08-07

## 2024-08-06 RX ORDER — ONDANSETRON HYDROCHLORIDE 2 MG/ML
4 INJECTION, SOLUTION INTRAVENOUS EVERY 6 HOURS PRN
Status: DISCONTINUED | OUTPATIENT
Start: 2024-08-06 | End: 2024-08-12 | Stop reason: HOSPADM

## 2024-08-06 RX ORDER — DONEPEZIL HYDROCHLORIDE 10 MG/1
10 TABLET, FILM COATED ORAL NIGHTLY
Status: DISCONTINUED | OUTPATIENT
Start: 2024-08-06 | End: 2024-08-12 | Stop reason: HOSPADM

## 2024-08-06 RX ORDER — ERGOCALCIFEROL 1.25 MG/1
50000 CAPSULE ORAL
Status: ON HOLD | COMMUNITY
End: 2024-08-07

## 2024-08-06 RX ORDER — SODIUM CHLORIDE 9 MG/ML
INJECTION, SOLUTION INTRAVENOUS CONTINUOUS
Status: DISCONTINUED | OUTPATIENT
Start: 2024-08-06 | End: 2024-08-08

## 2024-08-06 RX ORDER — METRONIDAZOLE 500 MG/100ML
500 INJECTION, SOLUTION INTRAVENOUS
Status: DISCONTINUED | OUTPATIENT
Start: 2024-08-06 | End: 2024-08-07

## 2024-08-06 RX ORDER — LEVOFLOXACIN 5 MG/ML
500 INJECTION, SOLUTION INTRAVENOUS
Status: DISCONTINUED | OUTPATIENT
Start: 2024-08-06 | End: 2024-08-09

## 2024-08-06 RX ORDER — INSULIN GLARGINE 300 [IU]/ML
80 INJECTION, SOLUTION SUBCUTANEOUS DAILY
COMMUNITY

## 2024-08-06 RX ADMIN — PIPERACILLIN AND TAZOBACTAM 4.5 G: 4; .5 INJECTION, POWDER, FOR SOLUTION INTRAVENOUS; PARENTERAL at 08:08

## 2024-08-06 RX ADMIN — POTASSIUM CHLORIDE 10 MEQ: 7.46 INJECTION, SOLUTION INTRAVENOUS at 06:08

## 2024-08-06 RX ADMIN — THIAMINE HYDROCHLORIDE: 100 INJECTION, SOLUTION INTRAMUSCULAR; INTRAVENOUS at 05:08

## 2024-08-06 RX ADMIN — ACETAMINOPHEN AND CODEINE PHOSPHATE 1 TABLET: 300; 30 TABLET ORAL at 06:08

## 2024-08-06 RX ADMIN — POTASSIUM CHLORIDE 40 MEQ: 1500 TABLET, EXTENDED RELEASE ORAL at 11:08

## 2024-08-06 RX ADMIN — ATORVASTATIN CALCIUM 40 MG: 40 TABLET, FILM COATED ORAL at 11:08

## 2024-08-06 RX ADMIN — SODIUM BICARBONATE 50 MEQ: 84 INJECTION, SOLUTION INTRAVENOUS at 11:08

## 2024-08-06 RX ADMIN — DONEPEZIL HYDROCHLORIDE 10 MG: 10 TABLET ORAL at 11:08

## 2024-08-06 RX ADMIN — LEVOFLOXACIN 500 MG: 500 INJECTION, SOLUTION INTRAVENOUS at 11:08

## 2024-08-06 RX ADMIN — POTASSIUM CHLORIDE 10 MEQ: 7.46 INJECTION, SOLUTION INTRAVENOUS at 08:08

## 2024-08-06 NOTE — PROVIDER PROGRESS NOTES - EMERGENCY DEPT.
Encounter Date: 8/6/2024    ED Physician Progress Notes        Physician Note:   7951 I spoke with  Going to plan any concerning this patient's status and that she will certainly need a blood transfusion and likely admission therefore he will need to assume care of this patient at this time

## 2024-08-06 NOTE — ED PROVIDER NOTES
Encounter Date: 8/6/2024       History     Chief Complaint   Patient presents with    Dehydration    Weakness     Reports that she was here last week and was dx with e coli. Has not been able to keep anything in for the last week. C/o diarrhea and weakness. Pt skin has notable pallor.      This 68-year-old female patient presents with complaints of profuse abdominal pain, blood in her stool, diarrhea x1 week, she states she was diagnosed with E coli a week ago and she was not given any medication.  The family called Dr. Renner's office and he ordered Lomotil which she has taken without improvement.      Review of patient's allergies indicates:   Allergen Reactions    Hydrocodone Rash     Past Medical History:   Diagnosis Date    Allergy     Clotting disorder     Dementia     Diabetes mellitus     GERD (gastroesophageal reflux disease)     Glaucoma     Hypertension     Stroke      Past Surgical History:   Procedure Laterality Date    OOPHORECTOMY      Pace Maker Right     PARTIAL HYSTERECTOMY      stents      x5    WRIST FRACTURE SURGERY Bilateral      Family History   Problem Relation Name Age of Onset    Heart disease Father      Heart disease Mother      Diabetes Mother      Lupus Sister       Social History     Tobacco Use    Smoking status: Every Day     Current packs/day: 0.50     Types: Cigarettes    Smokeless tobacco: Never   Substance Use Topics    Alcohol use: Not Currently    Drug use: Not Currently     Review of Systems   Gastrointestinal:  Positive for abdominal pain and diarrhea.   All other systems reviewed and are negative.      Physical Exam     Initial Vitals [08/06/24 1652]   BP Pulse Resp Temp SpO2   (!) 107/51 83 16 98.6 °F (37 °C) 100 %      MAP       --         Physical Exam    Nursing note and vitals reviewed.  Constitutional: She appears well-developed.   This patient appears pale and frail   HENT:   Head: Normocephalic and atraumatic.   Eyes: Pupils are equal, round, and reactive to light.    Neck: Neck supple.   Normal range of motion.  Cardiovascular:  Normal rate, regular rhythm and normal heart sounds.           Pulmonary/Chest: Breath sounds normal.   Abdominal: Abdomen is soft. Bowel sounds are normal. She exhibits no distension. There is abdominal tenderness.   Musculoskeletal:         General: Normal range of motion.      Cervical back: Normal range of motion and neck supple.     Neurological: She is alert and oriented to person, place, and time.   Skin: Skin is warm and dry. Capillary refill takes less than 2 seconds.   Psychiatric: She has a normal mood and affect. Her behavior is normal. Judgment and thought content normal.         ED Course   Procedures  Labs Reviewed   COMPREHENSIVE METABOLIC PANEL - Abnormal       Result Value    Sodium 134 (*)     Potassium 2.9 (*)     Chloride 104      CO2 18 (*)     Glucose 110      Blood Urea Nitrogen 36 (*)     Creatinine 2.33 (*)     Calcium 8.8      Protein Total 7.4      Albumin 3.7      Globulin 3.7      Albumin/Globulin Ratio 1.0      Bilirubin Total 0.2            ALT 19      AST 29      eGFR 22      Anion Gap 12.0      BUN/Creatinine Ratio 15     URINALYSIS, REFLEX TO URINE CULTURE - Abnormal    Color, UA Yellow      Appearance, UA Clear      Specific Gravity, UA 1.015      pH, UA 6.0      Protein,  (*)     Glucose,  (*)     Ketones, UA Negative      Blood, UA Moderate (*)     Bilirubin, UA Negative      Urobilinogen, UA 0.2      Nitrites, UA Negative      Leukocyte Esterase, UA Small (*)     Narrative:      URINE STABILITY IS 2 HOURS AT ROOM TEMP OR    SIX HOURS REFRIGERATED. PERFORMING TESTING ON    SPECIMENS GREATER THAN THIS AGE MAY AFFECT THE    FOLLOWING TESTS:    PH          SPECIFIC GRAVITY           BLOOD    CLARITY     BILIRUBIN               UROBILINOGEN   CBC WITH DIFFERENTIAL - Abnormal    WBC 16.88 (*)     RBC 3.03 (*)     Hgb 6.7 (*)     Hct 23.1 (*)     MCV 76.2 (*)     MCH 22.1 (*)     MCHC 29.0 (*)     RDW  15.4 (*)     Platelet 328      MPV 9.7      Neut % 88.6 (*)     Lymph % 5.3 (*)     Mono % 4.7      Eos % 0.2 (*)     Basophil % 0.2      Lymph # 0.89 (*)     Neut # 14.95 (*)     Mono # 0.79 (*)     Eos # 0.04      Baso # 0.04      IG# 0.17 (*)     IG% 1.0 (*)     NRBC% 0.0     URINALYSIS, MICROSCOPIC - Abnormal    Bacteria, UA Many (*)     RBC, UA None Seen      WBC, UA >100 (*)     Squamous Epithelial Cells, UA Occasional     LIPASE - Normal    Lipase Level 203     CULTURE, URINE   CBC W/ AUTO DIFFERENTIAL    Narrative:     The following orders were created for panel order CBC W/ AUTO DIFFERENTIAL.  Procedure                               Abnormality         Status                     ---------                               -----------         ------                     CBC with Differential[[id number]]       Abnormal            Final result                 Please view results for these tests on the individual orders.   TYPE & SCREEN    Specimen Outdate 08/09/2024 23:59      ABO and Rh O POS      Antibody Screen NEG     ABORH RETYPE    ABORH Retype O POS     PREPARE RBC SOFT    UNIT NUMBER [id number]      UNIT ABO/RH O POS      DISPENSE STATUS Selected      Unit Expiration [id number]      Product Code L3353T95      Unit Blood Type Code 5100      CROSSMATCH INTERPRETATION Compatible      UNIT NUMBER [id number]      UNIT ABO/RH O POS      DISPENSE STATUS Selected      Unit Expiration [id number]      Product Code Q1521B07      Unit Blood Type Code 5100      CROSSMATCH INTERPRETATION Compatible            Imaging Results              CT Abdomen Pelvis  Without Contrast (Final result)  Result time 08/06/24 20:38:28      Final result by Suleiman Fisher MD (08/06/24 20:38:28)                   Impression:        1. Fluid is present throughout the bowel suggesting the possibility of gastroenteritis.    n/a    CATEGORY: n/a    The following dose reduction techniques are used for all CT at [facility]  [facility]:    1.   Automated exposure control.    2.   Adjustment of the mA and/or kV according to patient size.    3.   Use of iterative reconstruction technique.      Electronically signed by: Suleiman Fisher  Date:    08/06/2024  Time:    20:38               Narrative:    EXAMINATION:  CT ABDOMEN PELVIS WITHOUT CONTRAST    CLINICAL HISTORY:  Abdominal pain, acute, nonlocalized;    TECHNIQUE:  Low dose axial images, sagittal and coronal reformations were obtained from the lung bases to the pubic symphysis.  Oral contrast was not administered.    COMPARISON:  None    FINDINGS:  Liver:  No clinically significant abnormalities noted.    Gallbladder/Biliary System:  No clinically significant abnormalities noted.    Spleen:  No clinically significant abnormalities noted.    Adrenal glands:  No clinically significant abnormalities noted.    Pancreas:  No clinically significant abnormalities noted.    Kidneys/Urinary Tract:  A 2.8 cm benign-appearing renal cortical cyst is noted involving the superior pole of the left kidney.    Urinary bladder:  No clinically significant abnormalities noted.    Prostate gland/uterus and ovaries:  No clinically significant abnormalities noted.    GI tract:  No clinically significant abnormalities noted.    Vascular structures:  Extensive atherosclerotic calcification is present involving the aorta and its major branches.    Musculoskeletal structures:  Moderate bony demineralization is present with moderate degenerative findings noted involving the spine.    Miscellaneous:  No clinically significant abnormalities noted.                                       Medications   0.9%  NaCl infusion (for blood administration) (has no administration in time range)   piperacillin-tazobactam (ZOSYN) 4.5 g in D5W 100 mL IVPB (MB+) (4.5 g Intravenous New Bag 8/6/24 2003)   0.9%  NaCl infusion (for blood administration) (has no administration in time range)   0.9% NaCl 1,000 mL with mvi, (ADULT)  no.4 with vit K 3,300 unit- 150 mcg/10 mL 10 mL, thiamine 100 mg, folic acid 1 mg infusion ( Intravenous New Bag 8/6/24 1755)   potassium chloride 10 mEq in 100 mL IVPB (0 mEq Intravenous Stopped 8/6/24 1932)   potassium chloride 10 mEq in 100 mL IVPB (0 mEq Intravenous Stopped 8/6/24 2118)   acetaminophen-codeine 300-30mg per tablet 1 tablet (1 tablet Oral Given 8/6/24 1818)     Medical Decision Making  Amount and/or Complexity of Data Reviewed  Labs: ordered.    Risk  Prescription drug management.               ED Course as of 08/06/24 2146   Tue Aug 06, 2024   1912 Potassium(!): 2.9 [WV]   1912 Sodium(!): 134 [WV]   1912 WBC(!): 16.88 [WV]   1912 Hemoglobin(!): 6.7 [WV]   1912 Hematocrit(!): 23.1 [WV]   1912 MCV(!): 76.2 [WV]   1912 MCH(!): 22.1 [WV]   1912 MCHC(!): 29.0 [WV]   1912 BUN(!): 36 [WV]   1912 Creatinine(!): 2.33 [WV]   2141 ACCEPTED FOR ADMISSION BY DR AVINA   [WV]      ED Course User Index  [WV] Wali Randhawa MD                       TOTAL CRITICAL CARE TIME:  120 minutes    Clinical Impression:  Final diagnoses:  [K92.2] Gastrointestinal hemorrhage, unspecified gastrointestinal hemorrhage type (Primary)  [E87.6] Hypokalemia  [A09] Infectious gastroenteritis  [D50.9] Hypochromic microcytic anemia  [N18.9] Chronic renal failure, unspecified CKD stage  [E87.20] Metabolic acidosis  [R19.8] Suspected hemorrhagic gastroenteritis          ED Disposition Condition    Admit Stable                Sara Farah FNP  08/06/24 1809       Wali Randhawa MD  08/06/24 2146

## 2024-08-07 ENCOUNTER — ANESTHESIA (OUTPATIENT)
Dept: GASTROENTEROLOGY | Facility: HOSPITAL | Age: 69
End: 2024-08-07
Payer: MEDICARE

## 2024-08-07 ENCOUNTER — ANESTHESIA EVENT (OUTPATIENT)
Dept: GASTROENTEROLOGY | Facility: HOSPITAL | Age: 69
End: 2024-08-07
Payer: MEDICARE

## 2024-08-07 PROBLEM — F17.200 TOBACCO DEPENDENCY: Status: ACTIVE | Noted: 2024-08-07

## 2024-08-07 PROBLEM — N17.9 AKI (ACUTE KIDNEY INJURY): Status: ACTIVE | Noted: 2024-08-07

## 2024-08-07 PROBLEM — E83.42 HYPOMAGNESEMIA: Status: ACTIVE | Noted: 2024-08-07

## 2024-08-07 LAB
ABO + RH BLD: NORMAL
ABO + RH BLD: NORMAL
ALBUMIN SERPL-MCNC: 3.2 G/DL (ref 3.4–5)
ALBUMIN/GLOB SERPL: 0.9 RATIO
ALP SERPL-CCNC: 91 UNIT/L (ref 50–144)
ALT SERPL-CCNC: 14 UNIT/L (ref 1–45)
ANION GAP SERPL CALC-SCNC: 8 MEQ/L (ref 2–13)
AST SERPL-CCNC: 30 UNIT/L (ref 14–36)
BASOPHILS # BLD AUTO: 0.03 X10(3)/MCL (ref 0.01–0.08)
BASOPHILS NFR BLD AUTO: 0.2 % (ref 0.1–1.2)
BILIRUB SERPL-MCNC: 1.7 MG/DL (ref 0–1)
BLD PROD TYP BPU: NORMAL
BLD PROD TYP BPU: NORMAL
BLOOD UNIT EXPIRATION DATE: NORMAL
BLOOD UNIT EXPIRATION DATE: NORMAL
BLOOD UNIT TYPE CODE: 5100
BLOOD UNIT TYPE CODE: 5100
BUN SERPL-MCNC: 32 MG/DL (ref 7–20)
C DIFF TOX A+B STL QL IA: NEGATIVE
C DIFF TOX GENS STL QL NAA+PROBE: DETECTED
CALCIUM SERPL-MCNC: 8.2 MG/DL (ref 8.4–10.2)
CHLORIDE SERPL-SCNC: 111 MMOL/L (ref 98–110)
CLOSTRIDIUM DIFFICILE GDH ANTIGEN (OHS): POSITIVE
CO2 SERPL-SCNC: 16 MMOL/L (ref 21–32)
CREAT SERPL-MCNC: 2.14 MG/DL (ref 0.66–1.25)
CREAT/UREA NIT SERPL: 15 (ref 12–20)
CROSSMATCH INTERPRETATION: NORMAL
CROSSMATCH INTERPRETATION: NORMAL
DISPENSE STATUS: NORMAL
DISPENSE STATUS: NORMAL
EOSINOPHIL # BLD AUTO: 0.07 X10(3)/MCL (ref 0.04–0.36)
EOSINOPHIL NFR BLD AUTO: 0.6 % (ref 0.7–7)
ERYTHROCYTE [DISTWIDTH] IN BLOOD BY AUTOMATED COUNT: 16.2 % (ref 11–14.5)
GFR SERPLBLD CREATININE-BSD FMLA CKD-EPI: 24 ML/MIN/1.73/M2
GLOBULIN SER-MCNC: 3.4 GM/DL (ref 2–3.9)
GLUCOSE SERPL-MCNC: 101 MG/DL (ref 70–115)
GLUCOSE SERPL-MCNC: 129 MG/DL (ref 70–110)
HCT VFR BLD AUTO: 34.6 % (ref 36–48)
HGB BLD-MCNC: 11 G/DL (ref 11.8–16)
IMM GRANULOCYTES # BLD AUTO: 0.11 X10(3)/MCL (ref 0–0.03)
IMM GRANULOCYTES NFR BLD AUTO: 0.9 % (ref 0–0.5)
LYMPHOCYTES # BLD AUTO: 0.73 X10(3)/MCL (ref 1.16–3.74)
LYMPHOCYTES NFR BLD AUTO: 5.9 % (ref 20–55)
MAGNESIUM SERPL-MCNC: 1.7 MG/DL (ref 1.8–2.4)
MCH RBC QN AUTO: 25.9 PG (ref 27–34)
MCHC RBC AUTO-ENTMCNC: 31.8 G/DL (ref 31–37)
MCV RBC AUTO: 81.4 FL (ref 79–99)
MONOCYTES # BLD AUTO: 0.69 X10(3)/MCL (ref 0.24–0.36)
MONOCYTES NFR BLD AUTO: 5.6 % (ref 4.7–12.5)
NEUTROPHILS # BLD AUTO: 10.67 X10(3)/MCL (ref 1.56–6.13)
NEUTROPHILS NFR BLD AUTO: 86.8 % (ref 37–73)
NRBC BLD AUTO-RTO: 0 %
PLATELET # BLD AUTO: 252 X10(3)/MCL (ref 140–371)
PMV BLD AUTO: 9.9 FL (ref 9.4–12.4)
POCT GLUCOSE: 123 MG/DL (ref 70–110)
POCT GLUCOSE: 125 MG/DL (ref 70–110)
POCT GLUCOSE: 75 MG/DL (ref 70–110)
POCT GLUCOSE: 98 MG/DL (ref 70–110)
POTASSIUM SERPL-SCNC: 4.6 MMOL/L (ref 3.5–5.1)
PROT SERPL-MCNC: 6.6 GM/DL (ref 6.3–8.2)
RBC # BLD AUTO: 4.25 X10(6)/MCL (ref 4–5.1)
SODIUM SERPL-SCNC: 135 MMOL/L (ref 136–145)
UNIT NUMBER: NORMAL
UNIT NUMBER: NORMAL
WBC # BLD AUTO: 12.3 X10(3)/MCL (ref 4–11.5)

## 2024-08-07 PROCEDURE — 21400001 HC TELEMETRY ROOM

## 2024-08-07 PROCEDURE — 25000003 PHARM REV CODE 250: Performed by: INTERNAL MEDICINE

## 2024-08-07 PROCEDURE — 25000003 PHARM REV CODE 250: Performed by: NURSE ANESTHETIST, CERTIFIED REGISTERED

## 2024-08-07 PROCEDURE — P9016 RBC LEUKOCYTES REDUCED: HCPCS | Performed by: SURGERY

## 2024-08-07 PROCEDURE — 0DB48ZX EXCISION OF ESOPHAGOGASTRIC JUNCTION, VIA NATURAL OR ARTIFICIAL OPENING ENDOSCOPIC, DIAGNOSTIC: ICD-10-PCS | Performed by: SURGERY

## 2024-08-07 PROCEDURE — 63600175 PHARM REV CODE 636 W HCPCS: Performed by: NURSE ANESTHETIST, CERTIFIED REGISTERED

## 2024-08-07 PROCEDURE — 11000001 HC ACUTE MED/SURG PRIVATE ROOM

## 2024-08-07 PROCEDURE — 36430 TRANSFUSION BLD/BLD COMPNT: CPT

## 2024-08-07 PROCEDURE — 51702 INSERT TEMP BLADDER CATH: CPT

## 2024-08-07 PROCEDURE — 80053 COMPREHEN METABOLIC PANEL: CPT | Performed by: FAMILY MEDICINE

## 2024-08-07 PROCEDURE — 25000003 PHARM REV CODE 250: Performed by: FAMILY MEDICINE

## 2024-08-07 PROCEDURE — A9560 TC99M LABELED RBC: HCPCS | Performed by: FAMILY MEDICINE

## 2024-08-07 PROCEDURE — 85025 COMPLETE CBC W/AUTO DIFF WBC: CPT | Performed by: FAMILY MEDICINE

## 2024-08-07 PROCEDURE — 63600175 PHARM REV CODE 636 W HCPCS: Performed by: FAMILY MEDICINE

## 2024-08-07 PROCEDURE — 87209 SMEAR COMPLEX STAIN: CPT | Performed by: INTERNAL MEDICINE

## 2024-08-07 PROCEDURE — 96366 THER/PROPH/DIAG IV INF ADDON: CPT

## 2024-08-07 PROCEDURE — 87045 FECES CULTURE AEROBIC BACT: CPT | Performed by: INTERNAL MEDICINE

## 2024-08-07 PROCEDURE — 83735 ASSAY OF MAGNESIUM: CPT | Performed by: INTERNAL MEDICINE

## 2024-08-07 PROCEDURE — 63600175 PHARM REV CODE 636 W HCPCS: Mod: JZ,JG | Performed by: INTERNAL MEDICINE

## 2024-08-07 PROCEDURE — 86318 IA INFECTIOUS AGENT ANTIBODY: CPT | Performed by: FAMILY MEDICINE

## 2024-08-07 PROCEDURE — 87493 C DIFF AMPLIFIED PROBE: CPT | Performed by: FAMILY MEDICINE

## 2024-08-07 PROCEDURE — 36415 COLL VENOUS BLD VENIPUNCTURE: CPT | Performed by: FAMILY MEDICINE

## 2024-08-07 PROCEDURE — 43239 EGD BIOPSY SINGLE/MULTIPLE: CPT | Performed by: SURGERY

## 2024-08-07 PROCEDURE — 25000003 PHARM REV CODE 250: Performed by: SURGERY

## 2024-08-07 PROCEDURE — 87427 SHIGA-LIKE TOXIN AG IA: CPT | Performed by: INTERNAL MEDICINE

## 2024-08-07 RX ORDER — AMOXICILLIN 500 MG
1 CAPSULE ORAL DAILY
COMMUNITY

## 2024-08-07 RX ORDER — METRONIDAZOLE 500 MG/100ML
500 INJECTION, SOLUTION INTRAVENOUS
Status: DISCONTINUED | OUTPATIENT
Start: 2024-08-07 | End: 2024-08-10

## 2024-08-07 RX ORDER — MUPIROCIN 20 MG/G
OINTMENT TOPICAL 2 TIMES DAILY
Status: COMPLETED | OUTPATIENT
Start: 2024-08-07 | End: 2024-08-11

## 2024-08-07 RX ORDER — HYDROCODONE BITARTRATE AND ACETAMINOPHEN 500; 5 MG/1; MG/1
TABLET ORAL
Status: DISCONTINUED | OUTPATIENT
Start: 2024-08-07 | End: 2024-08-12 | Stop reason: HOSPADM

## 2024-08-07 RX ORDER — DIPHENOXYLATE HYDROCHLORIDE AND ATROPINE SULFATE 2.5; .025 MG/1; MG/1
1 TABLET ORAL EVERY 6 HOURS PRN
COMMUNITY

## 2024-08-07 RX ORDER — LIDOCAINE HYDROCHLORIDE 20 MG/ML
INJECTION INTRAVENOUS
Status: DISCONTINUED | OUTPATIENT
Start: 2024-08-07 | End: 2024-08-07

## 2024-08-07 RX ORDER — INSULIN ASPART 100 [IU]/ML
0-5 INJECTION, SOLUTION INTRAVENOUS; SUBCUTANEOUS
Status: DISCONTINUED | OUTPATIENT
Start: 2024-08-07 | End: 2024-08-12

## 2024-08-07 RX ORDER — POTASSIUM CHLORIDE 7.45 MG/ML
10 INJECTION INTRAVENOUS
Status: COMPLETED | OUTPATIENT
Start: 2024-08-07 | End: 2024-08-07

## 2024-08-07 RX ORDER — PHENYLEPHRINE HCL 10 MG
1000 TABLET ORAL DAILY
COMMUNITY

## 2024-08-07 RX ORDER — PROPOFOL 10 MG/ML
VIAL (ML) INTRAVENOUS
Status: DISCONTINUED | OUTPATIENT
Start: 2024-08-07 | End: 2024-08-07

## 2024-08-07 RX ORDER — MAGNESIUM SULFATE HEPTAHYDRATE 40 MG/ML
2 INJECTION, SOLUTION INTRAVENOUS ONCE
Status: COMPLETED | OUTPATIENT
Start: 2024-08-07 | End: 2024-08-07

## 2024-08-07 RX ADMIN — FAMOTIDINE 20 MG: 10 INJECTION, SOLUTION INTRAVENOUS at 08:08

## 2024-08-07 RX ADMIN — POTASSIUM CHLORIDE 10 MEQ: 7.46 INJECTION, SOLUTION INTRAVENOUS at 02:08

## 2024-08-07 RX ADMIN — Medication 324 MG: at 09:08

## 2024-08-07 RX ADMIN — METRONIDAZOLE 500 MG: 5 INJECTION, SOLUTION INTRAVENOUS at 08:08

## 2024-08-07 RX ADMIN — POTASSIUM CHLORIDE 20 MEQ: 1500 TABLET, EXTENDED RELEASE ORAL at 02:08

## 2024-08-07 RX ADMIN — PROPOFOL 40 MG: 10 INJECTION, EMULSION INTRAVENOUS at 03:08

## 2024-08-07 RX ADMIN — POTASSIUM CHLORIDE 10 MEQ: 7.46 INJECTION, SOLUTION INTRAVENOUS at 03:08

## 2024-08-07 RX ADMIN — MUPIROCIN: 20 OINTMENT TOPICAL at 08:08

## 2024-08-07 RX ADMIN — SODIUM BICARBONATE 650 MG: 650 TABLET ORAL at 08:08

## 2024-08-07 RX ADMIN — METRONIDAZOLE 500 MG: 5 INJECTION, SOLUTION INTRAVENOUS at 04:08

## 2024-08-07 RX ADMIN — SODIUM CHLORIDE: 9 INJECTION, SOLUTION INTRAVENOUS at 09:08

## 2024-08-07 RX ADMIN — SODIUM CHLORIDE: 9 INJECTION, SOLUTION INTRAVENOUS at 12:08

## 2024-08-07 RX ADMIN — METRONIDAZOLE 500 MG: 500 INJECTION, SOLUTION INTRAVENOUS at 12:08

## 2024-08-07 RX ADMIN — LIDOCAINE HYDROCHLORIDE 50 MG: 20 INJECTION, SOLUTION INTRAVENOUS at 03:08

## 2024-08-07 RX ADMIN — MUPIROCIN: 20 OINTMENT TOPICAL at 09:08

## 2024-08-07 RX ADMIN — MAGNESIUM SULFATE HEPTAHYDRATE 2 G: 40 INJECTION, SOLUTION INTRAVENOUS at 04:08

## 2024-08-07 RX ADMIN — PROPOFOL 20 MG: 10 INJECTION, EMULSION INTRAVENOUS at 03:08

## 2024-08-07 RX ADMIN — MONTELUKAST 10 MG: 10 TABLET, FILM COATED ORAL at 09:08

## 2024-08-07 RX ADMIN — SODIUM CHLORIDE: 9 INJECTION, SOLUTION INTRAVENOUS at 02:08

## 2024-08-07 RX ADMIN — TECHNETIUM TC 99M-LABELED RED BLOOD CELLS 26.1 MILLICURIE: KIT at 01:08

## 2024-08-07 RX ADMIN — SODIUM BICARBONATE 650 MG: 650 TABLET ORAL at 09:08

## 2024-08-07 RX ADMIN — POTASSIUM CHLORIDE 10 MEQ: 7.46 INJECTION, SOLUTION INTRAVENOUS at 12:08

## 2024-08-07 RX ADMIN — POTASSIUM CHLORIDE 10 MEQ: 7.46 INJECTION, SOLUTION INTRAVENOUS at 04:08

## 2024-08-07 RX ADMIN — METOPROLOL TARTRATE 25 MG: 25 TABLET, FILM COATED ORAL at 09:08

## 2024-08-07 RX ADMIN — DONEPEZIL HYDROCHLORIDE 10 MG: 10 TABLET ORAL at 09:08

## 2024-08-07 RX ADMIN — Medication 1000 UNITS: at 08:08

## 2024-08-07 RX ADMIN — METOPROLOL TARTRATE 25 MG: 25 TABLET, FILM COATED ORAL at 08:08

## 2024-08-07 RX ADMIN — ATORVASTATIN CALCIUM 40 MG: 40 TABLET, FILM COATED ORAL at 09:08

## 2024-08-07 NOTE — PLAN OF CARE
08/07/24 0910   Discharge Assessment   Assessment Type Discharge Planning Assessment   Confirmed/corrected address, phone number and insurance Yes   Confirmed Demographics Correct on Facesheet   Source of Information patient   When was your last doctors appointment?   (does not remember)   Communicated JULIANNA with patient/caregiver Date not available/Unable to determine   Reason For Admission GI Bleed   People in Home sibling(s)   Facility Arrived From: home   Do you expect to return to your current living situation? Yes   Do you have help at home or someone to help you manage your care at home? Yes   Who are your caregiver(s) and their phone number(s)? sister and GE   Prior to hospitilization cognitive status: Alert/Oriented   Current cognitive status: Alert/Oriented   Walking or Climbing Stairs Difficulty yes   Walking or Climbing Stairs ambulation difficulty, requires equipment;transferring difficulty, requires equipment   Mobility Management rollator   Dressing/Bathing Difficulty yes   Dressing/Bathing bathing difficulty, requires equipment   Dressing/Bathing Management shower chair   Home Layout Able to live on 1st floor   Equipment Currently Used at Home rollator;shower chair   Readmission within 30 days? No   Patient currently being followed by outpatient case management? No   Do you currently have service(s) that help you manage your care at home? No   Do you take prescription medications? Yes   Do you have prescription coverage? Yes   Coverage [employer] M[employer]   Do you have any problems affording any of your prescribed medications? No   Is the patient taking medications as prescribed? yes   Who is going to help you get home at discharge? sister or DIL   How do you get to doctors appointments? family or friend will provide   Are you on dialysis? No   Do you take coumadin? No   Discharge Plan A Home   Discharge Plan B Home Health   DME Needed Upon Discharge  none   Discharge Plan discussed with: Patient    Transition of Care Barriers None   Physical Activity   On average, how many days per week do you engage in moderate to strenuous exercise (like a brisk walk)? 0 days   On average, how many minutes do you engage in exercise at this level? 0 min   Financial Resource Strain   How hard is it for you to pay for the very basics like food, housing, medical care, and heating? Not hard   Housing Stability   In the last 12 months, was there a time when you were not able to pay the mortgage or rent on time? N   At any time in the past 12 months, were you homeless or living in a shelter (including now)? N   Transportation Needs   Has the lack of transportation kept you from medical appointments, meetings, work or from getting things needed for daily living? No   Food Insecurity   Within the past 12 months, you worried that your food would run out before you got the money to buy more. Never true   Within the past 12 months, the food you bought just didn't last and you didn't have money to get more. Never true   Stress   Do you feel stress - tense, restless, nervous, or anxious, or unable to sleep at night because your mind is troubled all the time - these days? Only a littl   Social Isolation   How often do you feel lonely or isolated from those around you?  Never   Alcohol Use   Q1: How often do you have a drink containing alcohol? Never   Q2: How many drinks containing alcohol do you have on a typical day when you are drinking? None   Q3: How often do you have six or more drinks on one occasion? Never   Utilities   In the past 12 months has the electric, gas, oil, or water company threatened to shut off services in your home? No   Health Literacy   How often do you need to have someone help you when you read instructions, pamphlets, or other written material from your doctor or pharmacy? Rarely

## 2024-08-07 NOTE — H&P
Chief Complaint; bloody diarrhea over the past 2 weeks    HPI:   Patient is a 68 y.o. female with a medical history of hypertension, type 2 diabetes mellitus, gastroesophageal reflux disease, underlying dementia, chronic kidney disease stage IIIA, coronary artery disease status post coronary stent placement, status post permanent pacemaker placement an underlying thromboembolic phenomena probably DVT on Xarelto presents to the ER on account of bloody diarrhea over the past 2 weeks.    Patient has been at his usual state of health until about 2 weeks ago when he developed recurrence of bloody diarrhea associated with nonspecific diffuse abdominal pain.  Had episode of nonbloody vomiting recently.  But no more nausea or vomiting at this time..  No fever.  She does complain of generalized body weakness but no dizziness or loss of consciousness.  She decided to come to the ER for further evaluation.    At the ER, CT of the abdomen done was suggestive of gastroenteritis.  CBC indicates leukocytosis and hemoglobin 6.7.  She does have hypokalemia of 2.9.  With creatinine of 2.3.    PCP Roger Duenas III, MD MD        Past Medical History:   Diagnosis Date    Allergy     Clotting disorder     Dementia     Diabetes mellitus     GERD (gastroesophageal reflux disease)     Glaucoma     Hypertension     Stroke         Past Surgical History:   Procedure Laterality Date    OOPHORECTOMY      Pace Maker Right     PARTIAL HYSTERECTOMY      stents      x5    WRIST FRACTURE SURGERY Bilateral         (Not in a hospital admission)  Please see medication reconciliation chart    Review of patient's allergies indicates:   Allergen Reactions    Hydrocodone Rash        Social History     Tobacco Use    Smoking status: Every Day     Current packs/day: 0.50     Types: Cigarettes    Smokeless tobacco: Never   Substance Use Topics    Alcohol use: Not Currently        Family History   Problem Relation Name Age of Onset    Heart disease  "Father      Heart disease Mother      Diabetes Mother      Lupus Sister         Review of Systems  Review of Systems   Constitutional: Negative for fever.   HEENT: Negative for drooling, ear pain, facial swelling and nosebleeds.    Eyes: Negative for discharge and visual disturbance.   Respiratory: Negative for cough, negative shortness of breath.    Cardiovascular: negative for chest pain or SOB  Gastrointestinal; bloody diarrhea.  No nausea or vomiting.  Positive for abdominal pain  Genitourinary: Negative for decreased urine volume and dysuria  Musculoskeletal: Negative for neck pain.   Skin: Negative for rash.   Neurological: negative for numbness, negative for weakness,negative for seizures and facial asymmetry.              Objective:     I/O this shift:  In: 300 [IV Piggyback:300]  Out: -     BP (!) 135/43 (BP Location: Left arm, Patient Position: Lying)   Pulse 71   Temp 98.6 °F (37 °C) (Oral)   Resp 18   Ht 5' 2" (1.575 m)   Wt 44.9 kg (99 lb)   SpO2 98%   Breastfeeding No   BMI 18.11 kg/m²     General Appearance:    Awake, alert, not in acute distress   HEENT:   atraumatic, PERRL, EOM intact, conjuctiva pink, sclera anicteric, oropharynx moist, no lesions noted   Neck:    Supple, no JVD, no carotid bruits, no lymphadenopathy or thyromegaly noted       Pulmonary:   Clear to auscultation bilaterally, reduced breath sounds bileterally.   Cardiovascular:    Regular rate and rhythm, S1 S2 normal   Abdomen:     Soft, minimal diffuse abdominal tenderness r,nondistended, bowel sounds active all four quadrants, no masses, no organomegaly   Extremities:  no edema, no cyanosis or clubbing noted       Skin:   No bruises noted.       Neurologic: Alert, awake, oriented x 3, moves all extremities.                 Data Review :   Labs:    CBC:   Lab Results   Component Value Date    WBC 16.88 (H) 08/06/2024    RBC 3.03 (L) 08/06/2024    HGB 6.7 (L) 08/06/2024    HCT 23.1 (L) 08/06/2024     08/06/2024 " "    CMP:   Lab Results   Component Value Date     (L) 08/06/2024    K 2.9 (L) 08/06/2024     08/06/2024    CO2 18 (L) 08/06/2024    BUN 36 (H) 08/06/2024    CREATININE 2.33 (H) 08/06/2024    CALCIUM 8.8 08/06/2024    ALKPHOS 109 08/06/2024    AST 29 08/06/2024    ALT 19 08/06/2024    ALBUMIN 3.7 08/06/2024    BILITOT 0.2 08/06/2024     Cardiac markers:   Lab Results   Component Value Date     (H) 09/28/2019       Radiology:  Micro:  No components found for: "BLOODCX", "SPUTUMCX", "URINECX"    Radiology:  @[facility]@        Assessment & Plan:   1. Acute gastroenteritis; probably infectious etiology, obtain stool for C diff, ova and parasite and culture.  Place patient on IV Levaquin and Flagyl.  Continue IV fluids and analgesics.    2. Rectal bleeding; likely related to acute gastroenteritis.  CT of the abdomen shows evidence of acute gastroenteritis.  Consult surgery patient may require colonoscopy..  Monitor H&H    3. Acute blood loss anemia secondary to acute gastroenteritis; monitor H&H.  Give 2 units of PRBC's.    4. Acute kidney injury with a baseline chronic kidney disease stage IIIA, continue IV fluids and monitor kidney function avoid nephrotoxic agents.    5. Leukocytosis; secondary to acute gastroenteritis, continue antibiotics and repeat CBC in the morning.    6. Hypokalemia; secondary to diarrhea, being replaced.    7. Metabolic acidosis; secondary to combination of acute gastroenteritis/kidney dysfunction; do some sodium bicarbonate and repeat BNP in the morning.      8. History of hypertension; blood pressure relatively low, hold off on antihypertensive medication and monitor blood pressure closely.      9. Type 2 diabetes mellitus; place patient on insulin sliding scale and monitor finger glucose serially.    10. Underlying dementia; continue Aricept.      11. History of coronary artery disease status post coronary stent placement according to the patient.  Hold off on aspirin for " now.  Place on beta-blocker and statin.      12.  History of thromboembolic phenomena; probably DVT, on Xarelto.  Hold off on Xarelto.    13. Maintain the patient on DVT prophylaxis by way of SCD    Disposition; admit to inpatient status.    This note was completed using voice recognition software and transcription errors may occur.    This Encounter was via Telemedicine (Both audio and visual ) and from [place], [place].  Patient was located in [place].    Evaluation  of the patient was done alongside the patient's nursing staff       Ramila Lamb  8/6/2024  10:39 PM

## 2024-08-07 NOTE — ASSESSMENT & PLAN NOTE
Patient has acute blood loss due to hemorrhage, the hemorrhage is due to trauma/laceration, patient does not have a propensity for bleeding.. Will trend hemoglobin/hematocrit Daily, as well as monitor and correct for any coagulation defects. CBC and vital signs have been reviewed and last CBC was noted-   Lab Results   Component Value Date    WBC 12.30 (H) 08/07/2024    HGB 11.0 (L) 08/07/2024    HCT 34.6 (L) 08/07/2024    MCV 81.4 08/07/2024     08/07/2024         Will order a type and screen and consent patient for blood transfusion. Will transfuse if Hgb is <7g/dl (<8g/dl in cases of active ACS) or if patient has rapid bleeding leading to hemodynamic instability.

## 2024-08-07 NOTE — ASSESSMENT & PLAN NOTE
Patient's most recent potassium results are listed below.   Recent Labs     08/06/24  1720 08/07/24  1217   K 2.9* 4.6     Plan  - Replete potassium per protocol  - Monitor potassium Daily  - Patient's hypokalemia is improving  -

## 2024-08-07 NOTE — ASSESSMENT & PLAN NOTE
SALAZAR is likely due to pre-renal azotemia due to intravascular volume depletion. Baseline creatinine is    . Most recent creatinine and eGFR are listed below.  Recent Labs     08/06/24  1720 08/07/24  1217   CREATININE 2.33* 2.14*   EGFRNORACEVR 22 24      Plan  - SALAZAR is resolved  - Avoid nephrotoxins and renally dose meds for GFR listed above  - Monitor urine output, serial BMP, and adjust therapy as needed  -

## 2024-08-07 NOTE — PROGRESS NOTES
Inpatient Nutrition Assessment    Admit Date: 8/6/2024   Total duration of encounter: 1 day   Patient Age: 68 y.o.    Nutrition Recommendation/Prescription     Continue Clear Liquid Diet. Once medically appropriate ADAT to Renal Consistent Carbohydrate.     Pending Renal function and PO intake RD to add ONS as medically appropriate.     RD to add Banatrol with yogurt once medically appropriate.     Continue to encourage PO intake and honor patient preferences.    Dietitian will monitor Electrolytes, Energy Intake, Gastrointestinal Profile, Glucose/Endocrine Profile, Renal Function, Weight, and Weight Change and adjust MNT as needed.     Monitoring & Evaluation     Communication of Recommendations: reviewed with nurse and reviewed with patient    Reason Seen: continuous nutrition monitoring and malnutrition screening tool (MST)    Nutrition Risk/Follow-Up: moderate (follow-up in 3-5 days)     Next Date to be Seen by RD: 08/12/24  Please consult if re-assessment needed sooner.      Nutrition Assessment     Malnutrition Assessment/Nutrition-Focused Physical Exam             Weight Loss (Malnutrition):  (Per EMR patient with possible 7.7# (7.8%) in 1.5 weeks possibly d/t fluid losses related to diarrhea. Weight from 7/27 appears stated weight as such RD unable to determine at this time.) (08/07/24 6067)                                                  A minimum of two characteristics is recommended for diagnosis of either severe or non-severe malnutrition.    Chart Review    Malnutrition Screening Tool Results   Have you recently lost weight without trying?: Unsure  Have you been eating poorly because of a decreased appetite?: No   MST Score: 2     Diagnosis:  Acute gastroenteritis   Rectal bleeding; likely related to acute gastroenteritis   Acute blood loss anemia secondary to acute gastroenteritis  Acute kidney injury with a baseline chronic kidney disease stage IIIA   Leukocytosis; secondary to acute gastroenteritis    Hypokalemia; secondary to diarrhea   Metabolic acidosis; secondary to combination of acute gastroenteritis/kidney dysfunction   History of hypertension   Type 2 diabetes mellitus   Underlying dementia   History of coronary artery disease  History of thromboembolic phenomena    Past Medical History:   Diagnosis Date    Allergy     Clotting disorder     Dementia     Diabetes mellitus     GERD (gastroesophageal reflux disease)     Glaucoma     Hypertension     Stroke      Past Surgical History:   Procedure Laterality Date    OOPHORECTOMY      Pace Maker Right     PARTIAL HYSTERECTOMY      stents      x5    WRIST FRACTURE SURGERY Bilateral        Scheduled Medications:  atorvastatin, 40 mg, QHS  donepeziL, 10 mg, QHS  famotidine (PF), 20 mg, Daily  ferrous gluconate, 324 mg, Daily with breakfast  levoFLOXacin, 500 mg, Q48H  magnesium sulfate IVPB, 2 g, Once  metoprolol tartrate, 25 mg, BID  metronidazole, 500 mg, Q8H  montelukast, 10 mg, Nightly  mupirocin, , BID  sodium bicarbonate, 650 mg, TID  vitamin D, 1,000 Units, Daily    Continuous Infusions:  0.9% NaCl, Last Rate: 75 mL/hr at 08/07/24 0046    PRN Medications:   Current Facility-Administered Medications:     0.9%  NaCl infusion (for blood administration), , Intravenous, Q24H PRN    0.9%  NaCl infusion (for blood administration), , Intravenous, Q24H PRN    0.9%  NaCl infusion (for blood administration), , Intravenous, Q24H PRN    acetaminophen, 650 mg, Oral, Q6H PRN    dextrose 10%, 12.5 g, Intravenous, PRN    dextrose 10%, 25 g, Intravenous, PRN    glucagon (human recombinant), 1 mg, Intramuscular, PRN    glucose, 16 g, Oral, PRN    glucose, 24 g, Oral, PRN    insulin aspart U-100, 0-5 Units, Subcutaneous, QID (AC + HS) PRN    ondansetron, 4 mg, Intravenous, Q6H PRN    Calorie Containing IV Medications: no significant kcals from medications at this time    Nutrition-Related Labs:   Recent Labs   Lab 08/06/24  1720 08/07/24  1217   * 135*   K 2.9* 4.6  "  CALCIUM 8.8 8.2*   MG  --  1.70*   CO2 18* 16*   BUN 36* 32*   CREATININE 2.33* 2.14*   EGFRNORACEVR 22 24   GLUCOSE 110 101   BILITOT 0.2 1.7*   ALKPHOS 109 91   ALT 19 14   AST 29 30   ALBUMIN 3.7 3.2*   LIPASE 203  --    WBC 16.88* 12.30*   HGB 6.7* 11.0*   HCT 23.1* 34.6*     CrCl:    Lab Value: 16    Date: 8/7    Nutrition Orders:  Diet Clear Liquid      Appetite/Oral Intake: fair/not applicable  Factors Affecting Nutritional Intake: altered gastrointestinal function, clear liquid diet, diarrhea, and NPO  Social Needs Impacting Access to Food: none identified  Food Insecurity: No Food Insecurity (8/7/2024)    Hunger Vital Sign     Worried About Running Out of Food in the Last Year: Never true     Ran Out of Food in the Last Year: Never true     Food/Scientology/Cultural Preferences: Food Preferences: Dislike: Pork / Spiritual, Cultural Beliefs, Scientology Practices, Values that Affect Care: no  Food Allergies:   Review of patient's allergies indicates:   Allergen Reactions    Hydrocodone Rash         Skin Integrity: bruised (ecchymotic), abrasion  Wound(s):       Overview/Hospital Course:    (8/7): Patient reports hungry now but has not eaten in a couple of days due to diarrhea and believes she has lost weight just unsure of how much. Patient denied issues c/s and NKFA but did have complaints of diarrhea and gas. Patient is very sweet and delightful. Per EMR patient weighed 44.9 kg on 7/27, CBW- 41.4 kg showing possible 7.7# (7.8%) likely stated weight but possibly related to fluid losses "diarrhea". Diet was just advanced from NPO to clear liquid diet. GI: WDL except GI symptoms and INCONTINENT/LBM-8/7, : WDL except VOIDING abilities and CATHETER, FUNCTIONAL SCREEN: , Nutrition: 3-->adequate,Muang Score: 19, NO EDEMA NOTED, 24 HR I/O: 1789/0.     Anthropometrics    Height: 5' 2" (157.5 cm) Height Method: Stated  Last Weight: 41.4 kg (91 lb 3.2 oz) (08/07/24 0234) Weight Method: Bed Scale  BMI (Calculated): " 16.7  BMI Classification: underweight (BMI less than 18.5)     Ideal Body Weight (IBW), Female: 110 lb     % Ideal Body Weight, Female (lb): 82.91 %                             Usual Weight Provided By: patient and EMR weight history    Wt Readings from Last 5 Encounters:   08/07/24 41.4 kg (91 lb 3.2 oz)   07/27/24 44.9 kg (99 lb)   08/05/21 58.1 kg (128 lb)     Weight Change(s) Since Admission:  Admit Weight: 44.9 kg (99 lb) (08/06/24 1652)      Estimated Needs    Weight Used For Calorie Calculations: 41.4 kg (91 lb 4.3 oz)  Energy Calorie Requirements (kcal): 3425-2747 KCAL (26-30 KCAL/KG CBW)  Energy Need Method: Kcal/kg  Weight Used For Protein Calculations: 41.4 kg (91 lb 4.3 oz)  Protein Requirements: 29-37 GM PRO (0.7-0.9 GM/KG CBW)  Fluid Requirements (mL): 1000 ML H2O + DAILY OUTPUT  CHO Requirement: 108-124 GM CHO/DAY (40% KCAL FROM CHO)     Enteral Nutrition    Patient not receiving enteral nutrition at this time.    Parenteral Nutrition    Patient not receiving parenteral nutrition support at this time.    Evaluation of Received Nutrient Intake    Calories: not meeting estimated needs  Protein: not meeting estimated needs    Patient Education    Not applicable.         Nutrition Diagnosis     PES: Inadequate energy intake related to altered GI function as evidenced by conditions associated with diagnosis or treatment acute gastroenteritis . (new)    PES: Inadequate energy intake related to inability to consume sufficient nutrients as evidenced by underweight per BMI <18.5. (new)      Nutrition Interventions     Intervention(s): general/healthful diet, modified composition of meals/snacks, commercial beverage, multivitamin/mineral supplement therapy, and collaboration with other providers    Goal: Meet Greater than 75% of nutritional needs by discharge. (new)    Goal: Maintain weight throughout hospitalization. (new)    Nutrition Goals & Monitoring     Dietitian will monitor: food and beverage intake,  energy intake, weight, weight change, electrolyte/renal panel, glucose/endocrine profile, and gastrointestinal profile  Discharge planning:    too early to determine; pending clinical course  Next Date to be Seen by RD: 08/12/24  Please consult if re-assessment needed sooner.

## 2024-08-07 NOTE — HOSPITAL COURSE
08/07/2024  Pt had recent PCR GI done showed EPEC on 07/27/2024 has had diarreha since prior to this, bloody and was very weak. Found to have anemia and hypokalemia s/p 2U this am, replacing K, check mag level  08/08/2024 pt + for c. Diff, started vancomycin po  4 BM so far before noon today, tolerating clear liquids diet  08/09/2024  EPEC and C. Diff, abdominal pain today, 2 loose diarrhea stools this am.  Electrolytes have improved  08/10/2024 pt sugars high usually takes 80U of tudgjeo at home.  Still with loose and incontinent stool weak and PT has been ordered.  08/11/2024 pt awake and alert, diarrhea is a bit less, sugars still high, started on lantus today working with PT.

## 2024-08-07 NOTE — HPI
Patient is a 68 y.o. female with a medical history of hypertension, type 2 diabetes mellitus, gastroesophageal reflux disease, underlying dementia, chronic kidney disease stage IIIA, coronary artery disease status post coronary stent placement, status post permanent pacemaker placement an underlying thromboembolic phenomena probably DVT on Xarelto presents to the ER on account of bloody diarrhea over the past 2 weeks.    Patient has been at his usual state of health until about 2 weeks ago when he developed recurrence of bloody diarrhea associated with nonspecific diffuse abdominal pain.  Had episode of nonbloody vomiting recently.  But no more nausea or vomiting at this time..  No fever.  She does complain of generalized body weakness but no dizziness or loss of consciousness.  She decided to come to the ER for further evaluation.    At the ER, CT of the abdomen done was suggestive of gastroenteritis.  CBC indicates leukocytosis and hemoglobin 6.7.  She does have hypokalemia of 2.9.  With creatinine of 2.3.

## 2024-08-07 NOTE — NURSING
Nurses Note -- 4 Eyes      8/7/2024   3:14 AM      Skin assessed during: Admit      [] No Altered Skin Integrity Present    []Prevention Measures Documented      [x] Yes- Altered Skin Integrity Present or Discovered   [x] LDA Added if Not in Epic (Describe Wound)   [x] New Altered Skin Integrity was Present on Admit and Documented in LDA   [x] Wound Image Taken    Wound Care Consulted? No    Attending Nurse:  Estelle Huerta RN/Staff Member:  Laurel

## 2024-08-07 NOTE — PLAN OF CARE
Problem: Infection  Goal: Absence of Infection Signs and Symptoms  Outcome: Progressing     Problem: Adult Inpatient Plan of Care  Goal: Plan of Care Review  Outcome: Progressing  Goal: Patient-Specific Goal (Individualized)  Outcome: Progressing  Goal: Absence of Hospital-Acquired Illness or Injury  Outcome: Progressing  Goal: Optimal Comfort and Wellbeing  Outcome: Progressing  Goal: Readiness for Transition of Care  Outcome: Progressing     Problem: Fall Injury Risk  Goal: Absence of Fall and Fall-Related Injury  Outcome: Progressing     Problem: Fatigue  Goal: Improved Activity Tolerance  Outcome: Progressing     Problem: Electrolyte Imbalance  Goal: Electrolyte Balance  Outcome: Progressing

## 2024-08-07 NOTE — SUBJECTIVE & OBJECTIVE
Interval History:      Review of Systems   Constitutional:  Negative for activity change, appetite change and fever.   Respiratory:  Negative for chest tightness, shortness of breath and wheezing.    Cardiovascular:  Negative for chest pain.   Gastrointestinal:  Negative for abdominal pain, constipation, diarrhea, nausea and vomiting.   Genitourinary:  Negative for dysuria.   Skin:  Negative for rash and wound.   Neurological:  Negative for tremors and headaches.     Objective:     Vital Signs (Most Recent):  Temp: 97.6 °F (36.4 °C) (08/07/24 1100)  Pulse: 65 (08/07/24 1100)  Resp: 20 (08/07/24 1100)  BP: (!) 136/48 (08/07/24 1100)  SpO2: 100 % (08/07/24 1100) Vital Signs (24h Range):  Temp:  [97.4 °F (36.3 °C)-98.6 °F (37 °C)] 97.6 °F (36.4 °C)  Pulse:  [62-84] 65  Resp:  [16-20] 20  SpO2:  [97 %-100 %] 100 %  BP: (102-151)/(42-61) 136/48     Weight: 41.4 kg (91 lb 3.2 oz)  Body mass index is 16.68 kg/m².    Intake/Output Summary (Last 24 hours) at 8/7/2024 1553  Last data filed at 8/7/2024 0845  Gross per 24 hour   Intake 2125 ml   Output --   Net 2125 ml         Physical Exam  Vitals and nursing note reviewed. Exam conducted with a chaperone present.   Constitutional:       General: She is not in acute distress.     Appearance: Normal appearance. She is not ill-appearing, toxic-appearing or diaphoretic.   Cardiovascular:      Rate and Rhythm: Normal rate and regular rhythm.      Pulses: Normal pulses.      Heart sounds: Normal heart sounds. No murmur heard.  Pulmonary:      Effort: Pulmonary effort is normal. No respiratory distress.      Breath sounds: Normal breath sounds. No wheezing, rhonchi or rales.   Abdominal:      General: Abdomen is flat. Bowel sounds are normal. There is no distension.      Palpations: Abdomen is soft.      Tenderness: There is no abdominal tenderness. There is no guarding.   Musculoskeletal:         General: No swelling or tenderness. Normal range of motion.      Right lower leg: No  edema.      Left lower leg: No edema.   Skin:     General: Skin is warm and dry.      Coloration: Skin is not jaundiced or pale.   Neurological:      General: No focal deficit present.      Mental Status: She is alert and oriented to person, place, and time. Mental status is at baseline.   Psychiatric:         Mood and Affect: Mood normal.         Behavior: Behavior normal.             Significant Labs: All pertinent labs within the past 24 hours have been reviewed.  CBC:   Recent Labs   Lab 08/06/24  1720 08/07/24  1217   WBC 16.88* 12.30*   HGB 6.7* 11.0*   HCT 23.1* 34.6*    252     CMP:   Recent Labs   Lab 08/06/24  1720 08/07/24  1217   * 135*   K 2.9* 4.6    111*   CO2 18* 16*   BUN 36* 32*   CREATININE 2.33* 2.14*   CALCIUM 8.8 8.2*   ALBUMIN 3.7 3.2*   BILITOT 0.2 1.7*   ALKPHOS 109 91   AST 29 30   ALT 19 14       Significant Imaging: I have reviewed all pertinent imaging results/findings within the past 24 hours.

## 2024-08-07 NOTE — PLAN OF CARE
Problem: Infection  Goal: Absence of Infection Signs and Symptoms  Outcome: Progressing     Problem: Fall Injury Risk  Goal: Absence of Fall and Fall-Related Injury  Outcome: Progressing     Problem: Infection  Goal: Absence of Infection Signs and Symptoms  Outcome: Progressing     Problem: Adult Inpatient Plan of Care  Goal: Plan of Care Review  Outcome: Progressing  Goal: Patient-Specific Goal (Individualized)  Outcome: Progressing  Goal: Absence of Hospital-Acquired Illness or Injury  Outcome: Progressing  Goal: Optimal Comfort and Wellbeing  Outcome: Progressing  Goal: Readiness for Transition of Care  Outcome: Progressing     Problem: Fall Injury Risk  Goal: Absence of Fall and Fall-Related Injury  Outcome: Progressing     Problem: Fatigue  Goal: Improved Activity Tolerance  Outcome: Progressing

## 2024-08-07 NOTE — ASSESSMENT & PLAN NOTE
.Patient has Abnormal Magnesium: hypomagnesemia. Will continue to monitor electrolytes closely. Will replace the affected electrolytes and repeat labs to be done after interventions completed. The patient's magnesium results have been reviewed and are listed below.  Recent Labs   Lab 08/07/24  1217   MG 1.70*

## 2024-08-08 PROBLEM — A04.72 C. DIFFICILE COLITIS: Status: ACTIVE | Noted: 2024-08-08

## 2024-08-08 LAB
ALBUMIN SERPL-MCNC: 2.6 G/DL (ref 3.4–5)
ALBUMIN/GLOB SERPL: 0.9 RATIO
ALP SERPL-CCNC: 128 UNIT/L (ref 50–144)
ALT SERPL-CCNC: 17 UNIT/L (ref 1–45)
ANION GAP SERPL CALC-SCNC: 10 MEQ/L (ref 2–13)
AST SERPL-CCNC: 27 UNIT/L (ref 14–36)
BASOPHILS # BLD AUTO: 0.03 X10(3)/MCL (ref 0.01–0.08)
BASOPHILS NFR BLD AUTO: 0.3 % (ref 0.1–1.2)
BILIRUB SERPL-MCNC: 0.8 MG/DL (ref 0–1)
BUN SERPL-MCNC: 29 MG/DL (ref 7–20)
CALCIUM SERPL-MCNC: 8.4 MG/DL (ref 8.4–10.2)
CHLORIDE SERPL-SCNC: 113 MMOL/L (ref 98–110)
CO2 SERPL-SCNC: 13 MMOL/L (ref 21–32)
CREAT SERPL-MCNC: 1.92 MG/DL (ref 0.66–1.25)
CREAT/UREA NIT SERPL: 15 (ref 12–20)
EOSINOPHIL # BLD AUTO: 0.14 X10(3)/MCL (ref 0.04–0.36)
EOSINOPHIL NFR BLD AUTO: 1.3 % (ref 0.7–7)
ERYTHROCYTE [DISTWIDTH] IN BLOOD BY AUTOMATED COUNT: 16.8 % (ref 11–14.5)
GFR SERPLBLD CREATININE-BSD FMLA CKD-EPI: 28 ML/MIN/1.73/M2
GLOBULIN SER-MCNC: 3 GM/DL (ref 2–3.9)
GLUCOSE SERPL-MCNC: 76 MG/DL (ref 70–115)
HCT VFR BLD AUTO: 34.4 % (ref 36–48)
HGB BLD-MCNC: 10.6 G/DL (ref 11.8–16)
IMM GRANULOCYTES # BLD AUTO: 0.06 X10(3)/MCL (ref 0–0.03)
IMM GRANULOCYTES NFR BLD AUTO: 0.6 % (ref 0–0.5)
LYMPHOCYTES # BLD AUTO: 1.7 X10(3)/MCL (ref 1.16–3.74)
LYMPHOCYTES NFR BLD AUTO: 16.2 % (ref 20–55)
MAGNESIUM SERPL-MCNC: 2.1 MG/DL (ref 1.8–2.4)
MCH RBC QN AUTO: 25.5 PG (ref 27–34)
MCHC RBC AUTO-ENTMCNC: 30.8 G/DL (ref 31–37)
MCV RBC AUTO: 82.7 FL (ref 79–99)
MONOCYTES # BLD AUTO: 0.79 X10(3)/MCL (ref 0.24–0.36)
MONOCYTES NFR BLD AUTO: 7.5 % (ref 4.7–12.5)
NEUTROPHILS # BLD AUTO: 7.8 X10(3)/MCL (ref 1.56–6.13)
NEUTROPHILS NFR BLD AUTO: 74.1 % (ref 37–73)
NRBC BLD AUTO-RTO: 0 %
PLATELET # BLD AUTO: 246 X10(3)/MCL (ref 140–371)
PMV BLD AUTO: 9.6 FL (ref 9.4–12.4)
POCT GLUCOSE: 181 MG/DL (ref 70–110)
POCT GLUCOSE: 209 MG/DL (ref 70–110)
POCT GLUCOSE: 288 MG/DL (ref 70–110)
POCT GLUCOSE: 327 MG/DL (ref 70–110)
POTASSIUM SERPL-SCNC: 4.4 MMOL/L (ref 3.5–5.1)
PROT SERPL-MCNC: 5.6 GM/DL (ref 6.3–8.2)
RBC # BLD AUTO: 4.16 X10(6)/MCL (ref 4–5.1)
SODIUM SERPL-SCNC: 136 MMOL/L (ref 136–145)
WBC # BLD AUTO: 10.52 X10(3)/MCL (ref 4–11.5)

## 2024-08-08 PROCEDURE — 63600175 PHARM REV CODE 636 W HCPCS: Performed by: INTERNAL MEDICINE

## 2024-08-08 PROCEDURE — 27000207 HC ISOLATION

## 2024-08-08 PROCEDURE — 21400001 HC TELEMETRY ROOM

## 2024-08-08 PROCEDURE — 83735 ASSAY OF MAGNESIUM: CPT | Performed by: FAMILY MEDICINE

## 2024-08-08 PROCEDURE — 36415 COLL VENOUS BLD VENIPUNCTURE: CPT | Performed by: FAMILY MEDICINE

## 2024-08-08 PROCEDURE — 80053 COMPREHEN METABOLIC PANEL: CPT | Performed by: FAMILY MEDICINE

## 2024-08-08 PROCEDURE — 25000003 PHARM REV CODE 250: Performed by: FAMILY MEDICINE

## 2024-08-08 PROCEDURE — 85025 COMPLETE CBC W/AUTO DIFF WBC: CPT | Performed by: FAMILY MEDICINE

## 2024-08-08 PROCEDURE — 25000003 PHARM REV CODE 250: Performed by: INTERNAL MEDICINE

## 2024-08-08 PROCEDURE — 63600175 PHARM REV CODE 636 W HCPCS: Performed by: FAMILY MEDICINE

## 2024-08-08 RX ORDER — VANCOMYCIN HYDROCHLORIDE 125 MG/1
125 CAPSULE ORAL 4 TIMES DAILY
Status: DISCONTINUED | OUTPATIENT
Start: 2024-08-08 | End: 2024-08-12 | Stop reason: HOSPADM

## 2024-08-08 RX ADMIN — METRONIDAZOLE 500 MG: 5 INJECTION, SOLUTION INTRAVENOUS at 04:08

## 2024-08-08 RX ADMIN — MUPIROCIN: 20 OINTMENT TOPICAL at 08:08

## 2024-08-08 RX ADMIN — VANCOMYCIN HYDROCHLORIDE 125 MG: 125 CAPSULE ORAL at 08:08

## 2024-08-08 RX ADMIN — FAMOTIDINE 20 MG: 10 INJECTION, SOLUTION INTRAVENOUS at 08:08

## 2024-08-08 RX ADMIN — INSULIN ASPART 2 UNITS: 100 INJECTION, SOLUTION INTRAVENOUS; SUBCUTANEOUS at 11:08

## 2024-08-08 RX ADMIN — SODIUM BICARBONATE 650 MG: 650 TABLET ORAL at 08:08

## 2024-08-08 RX ADMIN — INSULIN ASPART 4 UNITS: 100 INJECTION, SOLUTION INTRAVENOUS; SUBCUTANEOUS at 04:08

## 2024-08-08 RX ADMIN — VANCOMYCIN HYDROCHLORIDE 125 MG: 125 CAPSULE ORAL at 04:08

## 2024-08-08 RX ADMIN — SODIUM BICARBONATE 650 MG: 650 TABLET ORAL at 03:08

## 2024-08-08 RX ADMIN — DONEPEZIL HYDROCHLORIDE 10 MG: 10 TABLET ORAL at 08:08

## 2024-08-08 RX ADMIN — MONTELUKAST 10 MG: 10 TABLET, FILM COATED ORAL at 08:08

## 2024-08-08 RX ADMIN — ATORVASTATIN CALCIUM 40 MG: 40 TABLET, FILM COATED ORAL at 08:08

## 2024-08-08 RX ADMIN — LEVOFLOXACIN 500 MG: 500 INJECTION, SOLUTION INTRAVENOUS at 11:08

## 2024-08-08 RX ADMIN — INSULIN ASPART 1 UNITS: 100 INJECTION, SOLUTION INTRAVENOUS; SUBCUTANEOUS at 08:08

## 2024-08-08 RX ADMIN — Medication 1000 UNITS: at 08:08

## 2024-08-08 RX ADMIN — METRONIDAZOLE 500 MG: 5 INJECTION, SOLUTION INTRAVENOUS at 01:08

## 2024-08-08 RX ADMIN — VANCOMYCIN HYDROCHLORIDE 125 MG: 125 CAPSULE ORAL at 12:08

## 2024-08-08 RX ADMIN — Medication 324 MG: at 08:08

## 2024-08-08 RX ADMIN — METOPROLOL TARTRATE 25 MG: 25 TABLET, FILM COATED ORAL at 08:08

## 2024-08-08 RX ADMIN — METRONIDAZOLE 500 MG: 5 INJECTION, SOLUTION INTRAVENOUS at 08:08

## 2024-08-08 RX ADMIN — METRONIDAZOLE 500 MG: 5 INJECTION, SOLUTION INTRAVENOUS at 11:08

## 2024-08-08 NOTE — ASSESSMENT & PLAN NOTE
Dangers of cigarette smoking were reviewed with patient in detail. Patient was Counseled for 3-10 minutes. Nicotine replacement options were discussed. Nicotine replacement was discussed- prescribed   General Discharge Instructions   PLEASE READ YOUR DISCHARGE INSTRUCTIONS ENTIRELY AS IT CONTAINS IMPORTANT INFORMATION.  If you were prescribed a narcotic or controlled medication, do not drive or operate heavy equipment or machinery while taking these medications.  If you were prescribed antibiotics, please take them to completion.  You must understand that you've received an Urgent Care treatment only and that you may be released before all your medical problems are known or treated. You, the patient, will arrange for follow up care as instructed.    OVER THE COUNTER RECOMMENDATIONS/SUGGESTIONS.    Make sure to stay well hydrated.    Use Nasal Saline to mechanically move any post nasal drip from your eustachian tube or from the back of your throat.    Use warm salt water gargles to ease your throat pain. Warm salt water gargles as needed for sore throat- 1/2 tsp salt to 1 cup warm water, gargle as desired.    Use an antihistamine such as Claritin, Zyrtec or Allegra to dry you out.    Use pseudoephedrine (behind the counter) to decongest. Pseudoephedrine 30 mg up to 240 mg /day. It can raise your blood pressure and give you palpitations.    Use mucinex (guaifenesin) to break up mucous up to 2400mg/day to loosen any mucous.    The mucinex DM pill has a cough suppressant that can be sedating. It can be used at night to stop the tickle at the back of your throat.    You can use Mucinex D (it has guaifenesin and a high dose of pseudoephedrine) in the mornings to help decongest.    Use Afrin in each nare for no longer than 3 days, as it is addictive. It can also dry out your mucous membranes and cause elevated blood pressure. This is especially useful if you are flying.    Use Flonase 1-2 sprays/nostril per day. It is a local acting steroid nasal spray, if you develop a bloody nose, stop using the medication immediately.    Sometimes Nyquil at night is beneficial to help you get some rest, however it is sedating and it  does have an antihistamine, and tylenol.    Honey is a natural cough suppressant that can be used.    Tylenol up to 4,000 mg a day is safe for short periods and can be used for body aches, pain, and fever. However in high doses and prolonged use it can cause liver irritation.    Ibuprofen is a non-steroidal anti-inflammatory that can be used for body aches, pain, and fever.However it can also cause stomach irritation if over used.     Follow up with your PCP or specialty clinic as instructed in the next 2-3 days if not improved or as needed. You can call [phone] to schedule an appointment with appropriate provider.      If you condition worsens, we recommend that you receive another evaluation at the emergency room immediately or contact your primary medical clinic's after hours call service to discuss your concerns.      Please return here or go to the Emergency Department for any concerns or worsening condition.   You can also call [phone] to schedule an appointment with the appropriate provider.    Please return here or go to the Emergency Department for any concerns or worsening of condition.    Thank you for choosing [facility]!    Our goal in the Urgent Care is to always provide outstanding medical care. You may receive a survey by mail or e-mail in the next week regarding your experience today. We would greatly appreciate you completing and returning the survey. Your feedback provides us with a way to recognize our staff who provide very good care, and it helps us learn how to improve when your experience was below our aspiration of excellence.      We appreciate you trusting us with your medical care. We hope you feel better soon. We will be happy to take care of you for all of your future medical needs.    Sincerely,    NEYDA Sanders

## 2024-08-08 NOTE — SUBJECTIVE & OBJECTIVE
Interval History:      Review of Systems   Constitutional:  Negative for activity change, appetite change and fever.   Respiratory:  Negative for chest tightness, shortness of breath and wheezing.    Cardiovascular:  Negative for chest pain.   Gastrointestinal:  Negative for abdominal pain, constipation, diarrhea, nausea and vomiting.   Genitourinary:  Negative for dysuria.   Skin:  Negative for rash and wound.   Neurological:  Negative for tremors and headaches.     Objective:     Vital Signs (Most Recent):  Temp: 97.6 °F (36.4 °C) (08/08/24 1100)  Pulse: 64 (08/08/24 1100)  Resp: 20 (08/08/24 1100)  BP: (!) 116/57 (08/08/24 1100)  SpO2: 97 % (08/08/24 1100) Vital Signs (24h Range):  Temp:  [97.4 °F (36.3 °C)-98.6 °F (37 °C)] 97.6 °F (36.4 °C)  Pulse:  [60-68] 64  Resp:  [18-20] 20  SpO2:  [96 %-100 %] 97 %  BP: (104-125)/(44-59) 116/57     Weight: 41.4 kg (91 lb 3.2 oz)  Body mass index is 16.68 kg/m².    Intake/Output Summary (Last 24 hours) at 8/8/2024 1345  Last data filed at 8/8/2024 0909  Gross per 24 hour   Intake 2515 ml   Output 1101 ml   Net 1414 ml         Physical Exam  Vitals and nursing note reviewed. Exam conducted with a chaperone present.   Constitutional:       General: She is not in acute distress.     Appearance: Normal appearance. She is not ill-appearing, toxic-appearing or diaphoretic.   Cardiovascular:      Rate and Rhythm: Normal rate and regular rhythm.      Pulses: Normal pulses.      Heart sounds: Normal heart sounds. No murmur heard.  Pulmonary:      Effort: Pulmonary effort is normal. No respiratory distress.      Breath sounds: Normal breath sounds. No wheezing, rhonchi or rales.   Abdominal:      General: Abdomen is flat. Bowel sounds are normal. There is no distension.      Palpations: Abdomen is soft.      Tenderness: There is no abdominal tenderness. There is no guarding.   Musculoskeletal:         General: No swelling or tenderness. Normal range of motion.      Right lower leg:  No edema.      Left lower leg: No edema.   Skin:     General: Skin is warm and dry.      Coloration: Skin is not jaundiced or pale.   Neurological:      General: No focal deficit present.      Mental Status: She is alert and oriented to person, place, and time. Mental status is at baseline.   Psychiatric:         Mood and Affect: Mood normal.         Behavior: Behavior normal.             Significant Labs: All pertinent labs within the past 24 hours have been reviewed.  CBC:   Recent Labs   Lab 08/06/24 1720 08/07/24 1217 08/08/24  0436   WBC 16.88* 12.30* 10.52   HGB 6.7* 11.0* 10.6*   HCT 23.1* 34.6* 34.4*    252 246     CMP:   Recent Labs   Lab 08/06/24 1720 08/07/24 1217 08/08/24  0436   * 135* 136   K 2.9* 4.6 4.4    111* 113*   CO2 18* 16* 13*   BUN 36* 32* 29*   CREATININE 2.33* 2.14* 1.92*   CALCIUM 8.8 8.2* 8.4   ALBUMIN 3.7 3.2* 2.6*   BILITOT 0.2 1.7* 0.8   ALKPHOS 109 91 128   AST 29 30 27   ALT 19 14 17       Significant Imaging: I have reviewed all pertinent imaging results/findings within the past 24 hours.

## 2024-08-08 NOTE — ASSESSMENT & PLAN NOTE
.Patient has Abnormal Magnesium: hypomagnesemia. Will continue to monitor electrolytes closely. Will replace the affected electrolytes and repeat labs to be done after interventions completed. The patient's magnesium results have been reviewed and are listed below.  Recent Labs   Lab 08/08/24  0436   MG 2.10

## 2024-08-08 NOTE — PLAN OF CARE
Problem: Infection  Goal: Absence of Infection Signs and Symptoms  Outcome: Progressing     Problem: Adult Inpatient Plan of Care  Goal: Plan of Care Review  Outcome: Progressing  Goal: Patient-Specific Goal (Individualized)  Outcome: Progressing  Goal: Absence of Hospital-Acquired Illness or Injury  Outcome: Progressing  Goal: Optimal Comfort and Wellbeing  Outcome: Progressing  Goal: Readiness for Transition of Care  Outcome: Progressing     Problem: Fall Injury Risk  Goal: Absence of Fall and Fall-Related Injury  Outcome: Progressing

## 2024-08-08 NOTE — PLAN OF CARE
Problem: Infection  Goal: Absence of Infection Signs and Symptoms  Outcome: Progressing     Problem: Adult Inpatient Plan of Care  Goal: Plan of Care Review  Outcome: Progressing  Goal: Patient-Specific Goal (Individualized)  Outcome: Progressing  Goal: Absence of Hospital-Acquired Illness or Injury  Outcome: Progressing  Goal: Optimal Comfort and Wellbeing  Outcome: Progressing  Goal: Readiness for Transition of Care  Outcome: Progressing     Problem: Fall Injury Risk  Goal: Absence of Fall and Fall-Related Injury  Outcome: Progressing     Problem: Fatigue  Goal: Improved Activity Tolerance  Outcome: Progressing     Problem: Electrolyte Imbalance  Goal: Electrolyte Balance  Outcome: Progressing     Problem: Wound  Goal: Optimal Coping  Outcome: Progressing  Goal: Optimal Functional Ability  Outcome: Progressing  Goal: Absence of Infection Signs and Symptoms  Outcome: Progressing  Goal: Improved Oral Intake  Outcome: Progressing  Goal: Optimal Pain Control and Function  Outcome: Progressing  Goal: Skin Health and Integrity  Outcome: Progressing  Goal: Optimal Wound Healing  Outcome: Progressing     Problem: Acute Kidney Injury/Impairment  Goal: Fluid and Electrolyte Balance  Outcome: Progressing  Goal: Improved Oral Intake  Outcome: Progressing  Goal: Effective Renal Function  Outcome: Progressing

## 2024-08-08 NOTE — ASSESSMENT & PLAN NOTE
Patient has acute blood loss due to hemorrhage, the hemorrhage is due to trauma/laceration, patient does not have a propensity for bleeding.. Will trend hemoglobin/hematocrit Daily, as well as monitor and correct for any coagulation defects. CBC and vital signs have been reviewed and last CBC was noted-   Lab Results   Component Value Date    WBC 10.52 08/08/2024    HGB 10.6 (L) 08/08/2024    HCT 34.4 (L) 08/08/2024    MCV 82.7 08/08/2024     08/08/2024         Will order a type and screen and consent patient for blood transfusion. Will transfuse if Hgb is <7g/dl (<8g/dl in cases of active ACS) or if patient has rapid bleeding leading to hemodynamic instability.

## 2024-08-08 NOTE — ASSESSMENT & PLAN NOTE
SALAZAR is likely due to pre-renal azotemia due to intravascular volume depletion. Baseline creatinine is    . Most recent creatinine and eGFR are listed below.  Recent Labs     08/06/24  1720 08/07/24  1217 08/08/24  0436   CREATININE 2.33* 2.14* 1.92*   EGFRNORACEVR 22 24 28        Plan  - SALAZAR is resolved  - Avoid nephrotoxins and renally dose meds for GFR listed above  - Monitor urine output, serial BMP, and adjust therapy as needed  -

## 2024-08-08 NOTE — ASSESSMENT & PLAN NOTE
Patient's most recent potassium results are listed below.   Recent Labs     08/06/24  1720 08/07/24  1217 08/08/24  0436   K 2.9* 4.6 4.4       Plan  - Replete potassium per protocol  - Monitor potassium Daily  - Patient's hypokalemia is improving  -

## 2024-08-08 NOTE — PROGRESS NOTES
[facility]/Surg  [facility] Medicine  Progress Note    Patient Name: Any Duncan  MRN: [id number]  Patient Class: IP- Inpatient   Admission Date: 8/6/2024  Length of Stay: 2 days  Attending Physician: Saira Maharaj MD  Primary Care Provider: Roger Duenas III, MD        Subjective:     Principal Problem:Enteritis, enteropathogenic E. coli        HPI:  Patient is a 68 y.o. female with a medical history of hypertension, type 2 diabetes mellitus, gastroesophageal reflux disease, underlying dementia, chronic kidney disease stage IIIA, coronary artery disease status post coronary stent placement, status post permanent pacemaker placement an underlying thromboembolic phenomena probably DVT on Xarelto presents to the ER on account of bloody diarrhea over the past 2 weeks.    Patient has been at his usual state of health until about 2 weeks ago when he developed recurrence of bloody diarrhea associated with nonspecific diffuse abdominal pain.  Had episode of nonbloody vomiting recently.  But no more nausea or vomiting at this time..  No fever.  She does complain of generalized body weakness but no dizziness or loss of consciousness.  She decided to come to the ER for further evaluation.    At the ER, CT of the abdomen done was suggestive of gastroenteritis.  CBC indicates leukocytosis and hemoglobin 6.7.  She does have hypokalemia of 2.9.  With creatinine of 2.3.    Overview/Hospital Course:  08/07/2024  Pt had recent PCR GI done showed EPEC on 07/27/2024 has had diarreha since prior to this, bloody and was very weak. Found to have anemia and hypokalemia s/p 2U this am, replacing K, check mag level  08/08/2024 pt + for c. Diff, started vancomycin po  4 BM so far before noon today, tolerating clear liquids diet    Interval History:      Review of Systems   Constitutional:  Negative for activity change, appetite change and fever.   Respiratory:  Negative for chest tightness, shortness of breath and  wheezing.    Cardiovascular:  Negative for chest pain.   Gastrointestinal:  Negative for abdominal pain, constipation, diarrhea, nausea and vomiting.   Genitourinary:  Negative for dysuria.   Skin:  Negative for rash and wound.   Neurological:  Negative for tremors and headaches.     Objective:     Vital Signs (Most Recent):  Temp: 97.6 °F (36.4 °C) (08/08/24 1100)  Pulse: 64 (08/08/24 1100)  Resp: 20 (08/08/24 1100)  BP: (!) 116/57 (08/08/24 1100)  SpO2: 97 % (08/08/24 1100) Vital Signs (24h Range):  Temp:  [97.4 °F (36.3 °C)-98.6 °F (37 °C)] 97.6 °F (36.4 °C)  Pulse:  [60-68] 64  Resp:  [18-20] 20  SpO2:  [96 %-100 %] 97 %  BP: (104-125)/(44-59) 116/57     Weight: 41.4 kg (91 lb 3.2 oz)  Body mass index is 16.68 kg/m².    Intake/Output Summary (Last 24 hours) at 8/8/2024 1345  Last data filed at 8/8/2024 0909  Gross per 24 hour   Intake 2515 ml   Output 1101 ml   Net 1414 ml         Physical Exam  Vitals and nursing note reviewed. Exam conducted with a chaperone present.   Constitutional:       General: She is not in acute distress.     Appearance: Normal appearance. She is not ill-appearing, toxic-appearing or diaphoretic.   Cardiovascular:      Rate and Rhythm: Normal rate and regular rhythm.      Pulses: Normal pulses.      Heart sounds: Normal heart sounds. No murmur heard.  Pulmonary:      Effort: Pulmonary effort is normal. No respiratory distress.      Breath sounds: Normal breath sounds. No wheezing, rhonchi or rales.   Abdominal:      General: Abdomen is flat. Bowel sounds are normal. There is no distension.      Palpations: Abdomen is soft.      Tenderness: There is no abdominal tenderness. There is no guarding.   Musculoskeletal:         General: No swelling or tenderness. Normal range of motion.      Right lower leg: No edema.      Left lower leg: No edema.   Skin:     General: Skin is warm and dry.      Coloration: Skin is not jaundiced or pale.   Neurological:      General: No focal deficit present.       Mental Status: She is alert and oriented to person, place, and time. Mental status is at baseline.   Psychiatric:         Mood and Affect: Mood normal.         Behavior: Behavior normal.             Significant Labs: All pertinent labs within the past 24 hours have been reviewed.  CBC:   Recent Labs   Lab 08/06/24 1720 08/07/24 1217 08/08/24 0436   WBC 16.88* 12.30* 10.52   HGB 6.7* 11.0* 10.6*   HCT 23.1* 34.6* 34.4*    252 246     CMP:   Recent Labs   Lab 08/06/24 1720 08/07/24 1217 08/08/24 0436   * 135* 136   K 2.9* 4.6 4.4    111* 113*   CO2 18* 16* 13*   BUN 36* 32* 29*   CREATININE 2.33* 2.14* 1.92*   CALCIUM 8.8 8.2* 8.4   ALBUMIN 3.7 3.2* 2.6*   BILITOT 0.2 1.7* 0.8   ALKPHOS 109 91 128   AST 29 30 27   ALT 19 14 17       Significant Imaging: I have reviewed all pertinent imaging results/findings within the past 24 hours.    Assessment/Plan:      * Enteritis, enteropathogenic E. coli  Continue po abx      C. difficile colitis  Added vancomycin po      Gastrointestinal hemorrhage  Patient has acute blood loss due to hemorrhage, the hemorrhage is due to trauma/laceration, patient does not have a propensity for bleeding.. Will trend hemoglobin/hematocrit Daily, as well as monitor and correct for any coagulation defects. CBC and vital signs have been reviewed and last CBC was noted-   Lab Results   Component Value Date    WBC 10.52 08/08/2024    HGB 10.6 (L) 08/08/2024    HCT 34.4 (L) 08/08/2024    MCV 82.7 08/08/2024     08/08/2024         Will order a type and screen and consent patient for blood transfusion. Will transfuse if Hgb is <7g/dl (<8g/dl in cases of active ACS) or if patient has rapid bleeding leading to hemodynamic instability.    Hypokalemia  Patient's most recent potassium results are listed below.   Recent Labs     08/06/24 1720 08/07/24 1217 08/08/24  0436   K 2.9* 4.6 4.4       Plan  - Replete potassium per protocol  - Monitor potassium Daily  -  Patient's hypokalemia is improving  -      Hypomagnesemia  .Patient has Abnormal Magnesium: hypomagnesemia. Will continue to monitor electrolytes closely. Will replace the affected electrolytes and repeat labs to be done after interventions completed. The patient's magnesium results have been reviewed and are listed below.  Recent Labs   Lab 08/08/24  0436   MG 2.10          SALAZAR (acute kidney injury)  SALAZAR is likely due to pre-renal azotemia due to intravascular volume depletion. Baseline creatinine is    . Most recent creatinine and eGFR are listed below.  Recent Labs     08/06/24  1720 08/07/24  1217 08/08/24  0436   CREATININE 2.33* 2.14* 1.92*   EGFRNORACEVR 22 24 28        Plan  - SALAZAR is resolved  - Avoid nephrotoxins and renally dose meds for GFR listed above  - Monitor urine output, serial BMP, and adjust therapy as needed  -      Tobacco dependency  Dangers of cigarette smoking were reviewed with patient in detail. Patient was Counseled for 3-10 minutes. Nicotine replacement options were discussed. Nicotine replacement was discussed- prescribed      VTE Risk Mitigation (From admission, onward)           Ordered     Place sequential compression device  Until discontinued         08/06/24 2237                    Discharge Planning   JULIANNA: 8/9/2024     Code Status: Full Code   Is the patient medically ready for discharge?:     Reason for patient still in hospital (select all that apply): Patient trending condition, Laboratory test, and Treatment  Discharge Plan A: Home                  Saira Maharaj MD  Department of Hospital Medicine   [facility]

## 2024-08-09 LAB
ANION GAP SERPL CALC-SCNC: 11 MEQ/L (ref 2–13)
BACTERIA UR CULT: ABNORMAL
BASOPHILS # BLD AUTO: 0.03 X10(3)/MCL (ref 0.01–0.08)
BASOPHILS NFR BLD AUTO: 0.3 % (ref 0.1–1.2)
BUN SERPL-MCNC: 30 MG/DL (ref 7–20)
CALCIUM SERPL-MCNC: 8.5 MG/DL (ref 8.4–10.2)
CHLORIDE SERPL-SCNC: 109 MMOL/L (ref 98–110)
CO2 SERPL-SCNC: 15 MMOL/L (ref 21–32)
CREAT SERPL-MCNC: 1.99 MG/DL (ref 0.66–1.25)
CREAT/UREA NIT SERPL: 15 (ref 12–20)
EOSINOPHIL # BLD AUTO: 0.12 X10(3)/MCL (ref 0.04–0.36)
EOSINOPHIL NFR BLD AUTO: 1.4 % (ref 0.7–7)
ERYTHROCYTE [DISTWIDTH] IN BLOOD BY AUTOMATED COUNT: 17.6 % (ref 11–14.5)
GFR SERPLBLD CREATININE-BSD FMLA CKD-EPI: 27 ML/MIN/1.73/M2
GLUCOSE SERPL-MCNC: 182 MG/DL (ref 70–115)
HCT VFR BLD AUTO: 34.4 % (ref 36–48)
HGB BLD-MCNC: 10.5 G/DL (ref 11.8–16)
IMM GRANULOCYTES # BLD AUTO: 0.03 X10(3)/MCL (ref 0–0.03)
IMM GRANULOCYTES NFR BLD AUTO: 0.3 % (ref 0–0.5)
LYMPHOCYTES # BLD AUTO: 0.87 X10(3)/MCL (ref 1.16–3.74)
LYMPHOCYTES NFR BLD AUTO: 10 % (ref 20–55)
MCH RBC QN AUTO: 25.5 PG (ref 27–34)
MCHC RBC AUTO-ENTMCNC: 30.5 G/DL (ref 31–37)
MCV RBC AUTO: 83.5 FL (ref 79–99)
MONOCYTES # BLD AUTO: 0.65 X10(3)/MCL (ref 0.24–0.36)
MONOCYTES NFR BLD AUTO: 7.5 % (ref 4.7–12.5)
NEUTROPHILS # BLD AUTO: 7.02 X10(3)/MCL (ref 1.56–6.13)
NEUTROPHILS NFR BLD AUTO: 80.5 % (ref 37–73)
NRBC BLD AUTO-RTO: 0 %
PLATELET # BLD AUTO: 253 X10(3)/MCL (ref 140–371)
PMV BLD AUTO: 10.1 FL (ref 9.4–12.4)
POCT GLUCOSE: 191 MG/DL (ref 70–110)
POCT GLUCOSE: 337 MG/DL (ref 70–110)
POTASSIUM SERPL-SCNC: 4.9 MMOL/L (ref 3.5–5.1)
RBC # BLD AUTO: 4.12 X10(6)/MCL (ref 4–5.1)
SODIUM SERPL-SCNC: 135 MMOL/L (ref 136–145)
WBC # BLD AUTO: 8.72 X10(3)/MCL (ref 4–11.5)

## 2024-08-09 PROCEDURE — 25000242 PHARM REV CODE 250 ALT 637 W/ HCPCS: Performed by: FAMILY MEDICINE

## 2024-08-09 PROCEDURE — 85025 COMPLETE CBC W/AUTO DIFF WBC: CPT | Performed by: FAMILY MEDICINE

## 2024-08-09 PROCEDURE — 63600175 PHARM REV CODE 636 W HCPCS: Performed by: INTERNAL MEDICINE

## 2024-08-09 PROCEDURE — 94640 AIRWAY INHALATION TREATMENT: CPT

## 2024-08-09 PROCEDURE — 27000207 HC ISOLATION

## 2024-08-09 PROCEDURE — 99900035 HC TECH TIME PER 15 MIN (STAT)

## 2024-08-09 PROCEDURE — 63600175 PHARM REV CODE 636 W HCPCS: Mod: JZ,JG | Performed by: FAMILY MEDICINE

## 2024-08-09 PROCEDURE — 25000003 PHARM REV CODE 250: Performed by: FAMILY MEDICINE

## 2024-08-09 PROCEDURE — 36415 COLL VENOUS BLD VENIPUNCTURE: CPT | Performed by: FAMILY MEDICINE

## 2024-08-09 PROCEDURE — 94761 N-INVAS EAR/PLS OXIMETRY MLT: CPT

## 2024-08-09 PROCEDURE — 80048 BASIC METABOLIC PNL TOTAL CA: CPT | Performed by: FAMILY MEDICINE

## 2024-08-09 PROCEDURE — 21400001 HC TELEMETRY ROOM

## 2024-08-09 PROCEDURE — 25000003 PHARM REV CODE 250: Performed by: INTERNAL MEDICINE

## 2024-08-09 RX ORDER — MICONAZOLE NITRATE 2 G/100G
POWDER TOPICAL 2 TIMES DAILY
Status: DISCONTINUED | OUTPATIENT
Start: 2024-08-09 | End: 2024-08-12 | Stop reason: HOSPADM

## 2024-08-09 RX ORDER — ALBUTEROL SULFATE 0.83 MG/ML
2.5 SOLUTION RESPIRATORY (INHALATION) EVERY 4 HOURS PRN
Status: DISCONTINUED | OUTPATIENT
Start: 2024-08-09 | End: 2024-08-12 | Stop reason: HOSPADM

## 2024-08-09 RX ADMIN — ALBUTEROL SULFATE 2.5 MG: 2.5 SOLUTION RESPIRATORY (INHALATION) at 10:08

## 2024-08-09 RX ADMIN — VANCOMYCIN HYDROCHLORIDE 125 MG: 125 CAPSULE ORAL at 10:08

## 2024-08-09 RX ADMIN — Medication: at 10:08

## 2024-08-09 RX ADMIN — Medication 324 MG: at 08:08

## 2024-08-09 RX ADMIN — MUPIROCIN: 20 OINTMENT TOPICAL at 10:08

## 2024-08-09 RX ADMIN — MONTELUKAST 10 MG: 10 TABLET, FILM COATED ORAL at 10:08

## 2024-08-09 RX ADMIN — METRONIDAZOLE 500 MG: 5 INJECTION, SOLUTION INTRAVENOUS at 04:08

## 2024-08-09 RX ADMIN — MICONAZOLE NITRATE ANTIFUNGAL POWDER: 2 POWDER TOPICAL at 10:08

## 2024-08-09 RX ADMIN — MICONAZOLE NITRATE ANTIFUNGAL POWDER: 2 POWDER TOPICAL at 12:08

## 2024-08-09 RX ADMIN — SODIUM BICARBONATE 650 MG: 650 TABLET ORAL at 08:08

## 2024-08-09 RX ADMIN — METRONIDAZOLE 500 MG: 5 INJECTION, SOLUTION INTRAVENOUS at 08:08

## 2024-08-09 RX ADMIN — VANCOMYCIN HYDROCHLORIDE 125 MG: 125 CAPSULE ORAL at 08:08

## 2024-08-09 RX ADMIN — SODIUM BICARBONATE 650 MG: 650 TABLET ORAL at 10:08

## 2024-08-09 RX ADMIN — Medication: at 12:08

## 2024-08-09 RX ADMIN — Medication 1000 UNITS: at 08:08

## 2024-08-09 RX ADMIN — DONEPEZIL HYDROCHLORIDE 10 MG: 10 TABLET ORAL at 10:08

## 2024-08-09 RX ADMIN — VANCOMYCIN HYDROCHLORIDE 125 MG: 125 CAPSULE ORAL at 12:08

## 2024-08-09 RX ADMIN — CEFTRIAXONE SODIUM 1 G: 1 INJECTION, POWDER, FOR SOLUTION INTRAMUSCULAR; INTRAVENOUS at 04:08

## 2024-08-09 RX ADMIN — METOPROLOL TARTRATE 25 MG: 25 TABLET, FILM COATED ORAL at 08:08

## 2024-08-09 RX ADMIN — ATORVASTATIN CALCIUM 40 MG: 40 TABLET, FILM COATED ORAL at 10:08

## 2024-08-09 RX ADMIN — SODIUM BICARBONATE 650 MG: 650 TABLET ORAL at 02:08

## 2024-08-09 RX ADMIN — ONDANSETRON 4 MG: 2 INJECTION INTRAMUSCULAR; INTRAVENOUS at 11:08

## 2024-08-09 RX ADMIN — INSULIN ASPART 2 UNITS: 100 INJECTION, SOLUTION INTRAVENOUS; SUBCUTANEOUS at 11:08

## 2024-08-09 RX ADMIN — VANCOMYCIN HYDROCHLORIDE 125 MG: 125 CAPSULE ORAL at 04:08

## 2024-08-09 RX ADMIN — METOPROLOL TARTRATE 25 MG: 25 TABLET, FILM COATED ORAL at 10:08

## 2024-08-09 RX ADMIN — MUPIROCIN: 20 OINTMENT TOPICAL at 08:08

## 2024-08-09 RX ADMIN — FAMOTIDINE 20 MG: 10 INJECTION, SOLUTION INTRAVENOUS at 08:08

## 2024-08-09 NOTE — ASSESSMENT & PLAN NOTE
Patient has acute blood loss due to hemorrhage, the hemorrhage is due to trauma/laceration, patient does not have a propensity for bleeding.. Will trend hemoglobin/hematocrit Daily, as well as monitor and correct for any coagulation defects. CBC and vital signs have been reviewed and last CBC was noted-   Lab Results   Component Value Date    WBC 8.72 08/09/2024    HGB 10.5 (L) 08/09/2024    HCT 34.4 (L) 08/09/2024    MCV 83.5 08/09/2024     08/09/2024         Will order a type and screen and consent patient for blood transfusion. Will transfuse if Hgb is <7g/dl (<8g/dl in cases of active ACS) or if patient has rapid bleeding leading to hemodynamic instability.

## 2024-08-09 NOTE — PLAN OF CARE
Problem: Infection  Goal: Absence of Infection Signs and Symptoms  Outcome: Progressing     Problem: Fatigue  Goal: Improved Activity Tolerance  Outcome: Progressing     Problem: Electrolyte Imbalance  Goal: Electrolyte Balance  Outcome: Progressing     Problem: Wound  Goal: Optimal Coping  Outcome: Progressing  Goal: Optimal Functional Ability  Outcome: Progressing  Goal: Absence of Infection Signs and Symptoms  Outcome: Progressing  Goal: Improved Oral Intake  Outcome: Progressing  Goal: Optimal Pain Control and Function  Outcome: Progressing  Goal: Skin Health and Integrity  Outcome: Progressing  Goal: Optimal Wound Healing  Outcome: Progressing

## 2024-08-09 NOTE — NURSING
Pt with expiratory wheezing, denies sob, oxygen sats 97% on room air,pt states she gets this wheezing off and on.Dr. Lamb notified and awaiting call back.

## 2024-08-09 NOTE — ASSESSMENT & PLAN NOTE
".Patient has Abnormal Magnesium: hypomagnesemia. Will continue to monitor electrolytes closely. Will replace the affected electrolytes and repeat labs to be done after interventions completed. The patient's magnesium results have been reviewed and are listed below.  No results for input(s): "MG" in the last 24 hours.     "

## 2024-08-09 NOTE — SUBJECTIVE & OBJECTIVE
Interval History:      Review of Systems   Constitutional:  Negative for activity change, appetite change and fever.   Respiratory:  Negative for chest tightness, shortness of breath and wheezing.    Cardiovascular:  Negative for chest pain.   Gastrointestinal:  Negative for abdominal pain, constipation, diarrhea, nausea and vomiting.   Genitourinary:  Negative for dysuria.   Skin:  Negative for rash and wound.   Neurological:  Negative for tremors and headaches.     Objective:     Vital Signs (Most Recent):  Temp: 97.4 °F (36.3 °C) (08/09/24 1522)  Pulse: 64 (08/09/24 1522)  Resp: 18 (08/09/24 1522)  BP: (!) 144/63 (08/09/24 1522)  SpO2: 95 % (08/09/24 1522) Vital Signs (24h Range):  Temp:  [97.4 °F (36.3 °C)-98.8 °F (37.1 °C)] 97.4 °F (36.3 °C)  Pulse:  [59-68] 64  Resp:  [18-20] 18  SpO2:  [95 %-99 %] 95 %  BP: (126-149)/(50-98) 144/63     Weight: 49.2 kg (108 lb 6.4 oz)  Body mass index is 19.83 kg/m².    Intake/Output Summary (Last 24 hours) at 8/9/2024 1523  Last data filed at 8/9/2024 1304  Gross per 24 hour   Intake 920 ml   Output 600 ml   Net 320 ml         Physical Exam  Vitals and nursing note reviewed. Exam conducted with a chaperone present.   Constitutional:       General: She is not in acute distress.     Appearance: Normal appearance. She is not ill-appearing, toxic-appearing or diaphoretic.   Cardiovascular:      Rate and Rhythm: Normal rate and regular rhythm.      Pulses: Normal pulses.      Heart sounds: Normal heart sounds. No murmur heard.  Pulmonary:      Effort: Pulmonary effort is normal. No respiratory distress.      Breath sounds: Normal breath sounds. No wheezing, rhonchi or rales.   Abdominal:      General: Abdomen is flat. Bowel sounds are normal. There is no distension.      Palpations: Abdomen is soft.      Tenderness: There is no abdominal tenderness. There is no guarding.   Musculoskeletal:         General: No swelling or tenderness. Normal range of motion.      Right lower leg: No  edema.      Left lower leg: No edema.   Skin:     General: Skin is warm and dry.      Coloration: Skin is not jaundiced or pale.   Neurological:      General: No focal deficit present.      Mental Status: She is alert and oriented to person, place, and time. Mental status is at baseline.   Psychiatric:         Mood and Affect: Mood normal.         Behavior: Behavior normal.             Significant Labs: All pertinent labs within the past 24 hours have been reviewed.  CBC:   Recent Labs   Lab 08/08/24 0436 08/09/24 0419   WBC 10.52 8.72   HGB 10.6* 10.5*   HCT 34.4* 34.4*    253     CMP:   Recent Labs   Lab 08/08/24 0436 08/09/24 0419    135*   K 4.4 4.9   * 109   CO2 13* 15*   BUN 29* 30*   CREATININE 1.92* 1.99*   CALCIUM 8.4 8.5   ALBUMIN 2.6*  --    BILITOT 0.8  --    ALKPHOS 128  --    AST 27  --    ALT 17  --        Significant Imaging: I have reviewed all pertinent imaging results/findings within the past 24 hours.

## 2024-08-09 NOTE — PROGRESS NOTES
[facility]/Surg  Wound Care    Patient Name:  Any Duncan   MRN:  [id number]  Date: 8/9/2024  Diagnosis: Enteritis, enteropathogenic E. coli    History:     Past Medical History:   Diagnosis Date    Allergy     Clotting disorder     Dementia     Diabetes mellitus     GERD (gastroesophageal reflux disease)     Glaucoma     Hypertension     Stroke        Social History     Socioeconomic History    Marital status:    Tobacco Use    Smoking status: Every Day     Current packs/day: 0.50     Types: Cigarettes    Smokeless tobacco: Never   Substance and Sexual Activity    Alcohol use: Not Currently    Drug use: Not Currently    Sexual activity: Not Currently     Partners: Male     Social Determinants of Health     Financial Resource Strain: Low Risk  (8/7/2024)    Overall Financial Resource Strain (CARDIA)     Difficulty of Paying Living Expenses: Not hard at all   Food Insecurity: No Food Insecurity (8/7/2024)    Hunger Vital Sign     Worried About Running Out of Food in the Last Year: Never true     Ran Out of Food in the Last Year: Never true   Transportation Needs: No Transportation Needs (8/7/2024)    TRANSPORTATION NEEDS     Transportation : No   Physical Activity: Inactive (8/7/2024)    Exercise Vital Sign     Days of Exercise per Week: 0 days     Minutes of Exercise per Session: 0 min   Stress: No Stress Concern Present (8/7/2024)    [employer] - Occupational Stress Questionnaire     Feeling of Stress : Only a little   Housing Stability: Low Risk  (8/7/2024)    Housing Stability Vital Sign     Unable to Pay for Housing in the Last Year: No     Homeless in the Last Year: No       Precautions:     Allergies as of 08/06/2024 - Reviewed 08/06/2024   Allergen Reaction Noted    Hydrocodone Rash 08/05/2021       WOC Assessment Details/Treatment        08/09/24 1059   WOCN Assessment   WOCN Total Time (mins) 20   Visit Date 08/09/24   Visit Time 1030   Consult Type  New   [facility] Wound   Wound moisture   Intervention assessed   Teaching on-going        Wound 08/07/24 0145 Abrasion(s) Right Cheek   Date First Assessed/Time First Assessed: 08/07/24 0145   Present on Original Admission: Yes  Primary Wound Type: Abrasion(s)  Side: Right  Location: Cheek  Is this injury device related?: No   Wound Image    Dressing Appearance Open to air   Drainage Amount None   Appearance Red;Dry   Periwound Area Intact   Wound Length (cm) 1 cm   Wound Width (cm) 1.5 cm   Wound Depth (cm) 0.1 cm   Wound Volume (cm^3) 0.15 cm^3   Wound Surface Area (cm^2) 1.5 cm^2   Dressing   (left open to air.)        Wound 08/09/24 1102 Moisture associated dermatitis Perirectal   Date First Assessed/Time First Assessed: 08/09/24 1102   Primary Wound Type: Moisture associated dermatitis  Location: Perirectal   Wound Image    Dressing Appearance Open to air   Drainage Amount None   Appearance Intact;Red   Periwound Area Redness   Care Cleansed with:  (bathing wipes.  New order for aquaphor/questran ointment and antifungal powder)       Orders placed.     08/09/2024

## 2024-08-09 NOTE — PLAN OF CARE
08/09/24 1306   Discharge Reassessment   Assessment Type Discharge Planning Reassessment   Did the patient's condition or plan change since previous assessment? No   Discharge Plan discussed with: Patient   Communicated JULIANNA with patient/caregiver Date not available/Unable to determine   Discharge Plan A Home   Discharge Plan B Home Health   DME Needed Upon Discharge  none   Transition of Care Barriers None   Why the patient remains in the hospital Requires continued medical care   Post-Acute Status   Discharge Delays None known at this time

## 2024-08-09 NOTE — ASSESSMENT & PLAN NOTE
Patient's most recent potassium results are listed below.   Recent Labs     08/07/24  1217 08/08/24  0436 08/09/24  0419   K 4.6 4.4 4.9       Plan  - Replete potassium per protocol  - Monitor potassium Daily  - Patient's hypokalemia is improving  -

## 2024-08-09 NOTE — ASSESSMENT & PLAN NOTE
SALAZAR is likely due to pre-renal azotemia due to intravascular volume depletion. Baseline creatinine is    . Most recent creatinine and eGFR are listed below.  Recent Labs     08/07/24  1217 08/08/24  0436 08/09/24  0419   CREATININE 2.14* 1.92* 1.99*   EGFRNORACEVR 24 28 27        Plan  - SALAZAR is resolved  - Avoid nephrotoxins and renally dose meds for GFR listed above  - Monitor urine output, serial BMP, and adjust therapy as needed  -

## 2024-08-09 NOTE — PLAN OF CARE
Problem: Infection  Goal: Absence of Infection Signs and Symptoms  8/9/2024 0945 by Monik Newton RN  Outcome: Progressing  8/9/2024 0944 by Monik Newton RN  Outcome: Progressing     Problem: Adult Inpatient Plan of Care  Goal: Plan of Care Review  8/9/2024 0945 by Monik Newton RN  Outcome: Progressing  8/9/2024 0944 by Monik Newton RN  Outcome: Progressing  Goal: Patient-Specific Goal (Individualized)  8/9/2024 0945 by Monik Newton RN  Outcome: Progressing  8/9/2024 0944 by Monik Newton RN  Outcome: Progressing  Goal: Absence of Hospital-Acquired Illness or Injury  8/9/2024 0945 by Monik Newton RN  Outcome: Progressing  8/9/2024 0944 by Monik Newton RN  Outcome: Progressing  Goal: Optimal Comfort and Wellbeing  8/9/2024 0945 by Monik Newton RN  Outcome: Progressing  8/9/2024 0944 by Monik Newton RN  Outcome: Progressing  Goal: Readiness for Transition of Care  8/9/2024 0945 by Monik Newton RN  Outcome: Progressing  8/9/2024 0944 by Monik Newton RN  Outcome: Progressing     Problem: Fall Injury Risk  Goal: Absence of Fall and Fall-Related Injury  8/9/2024 0945 by Monik Newton RN  Outcome: Progressing  8/9/2024 0944 by Monik Newton RN  Outcome: Progressing     Problem: Fatigue  Goal: Improved Activity Tolerance  8/9/2024 0945 by Monik Newton RN  Outcome: Progressing  8/9/2024 0944 by Monik Newton RN  Outcome: Progressing     Problem: Electrolyte Imbalance  Goal: Electrolyte Balance  8/9/2024 0945 by Monik Newton RN  Outcome: Progressing  8/9/2024 0944 by Monik Newton RN  Outcome: Progressing     Problem: Wound  Goal: Optimal Coping  8/9/2024 0945 by Monik Newton RN  Outcome: Progressing  8/9/2024 0944 by Monik Newton RN  Outcome: Progressing  Goal: Optimal Functional Ability  8/9/2024 0945 by Monik Newton RN  Outcome: Progressing  8/9/2024 0944 by Monik Newton RN  Outcome: Progressing  Goal: Absence of Infection Signs and Symptoms  8/9/2024 0945 by  Aman, Monik, RN  Outcome: Progressing  8/9/2024 0944 by Monik Newton RN  Outcome: Progressing  Goal: Improved Oral Intake  8/9/2024 0945 by Monik Newton RN  Outcome: Progressing  8/9/2024 0944 by Monik Newton RN  Outcome: Progressing  Goal: Optimal Pain Control and Function  8/9/2024 0945 by Monik Newton RN  Outcome: Progressing  8/9/2024 0944 by Monik Newton RN  Outcome: Progressing  Goal: Skin Health and Integrity  8/9/2024 0945 by Monik Newton RN  Outcome: Progressing  8/9/2024 0944 by Monik Newton RN  Outcome: Progressing  Goal: Optimal Wound Healing  8/9/2024 0945 by Monik Newton RN  Outcome: Progressing  8/9/2024 0944 by Monik Newton RN  Outcome: Progressing

## 2024-08-09 NOTE — PLAN OF CARE
Problem: Infection  Goal: Absence of Infection Signs and Symptoms  Outcome: Progressing     Problem: Adult Inpatient Plan of Care  Goal: Plan of Care Review  Outcome: Progressing  Goal: Patient-Specific Goal (Individualized)  Outcome: Progressing  Goal: Absence of Hospital-Acquired Illness or Injury  Outcome: Progressing  Goal: Optimal Comfort and Wellbeing  Outcome: Progressing  Goal: Readiness for Transition of Care  Outcome: Progressing     Problem: Fall Injury Risk  Goal: Absence of Fall and Fall-Related Injury  Outcome: Progressing     Problem: Electrolyte Imbalance  Goal: Electrolyte Balance  Outcome: Progressing     Problem: Fatigue  Goal: Improved Activity Tolerance  Outcome: Progressing     Problem: Wound  Goal: Optimal Coping  Outcome: Progressing  Goal: Optimal Functional Ability  Outcome: Progressing  Goal: Absence of Infection Signs and Symptoms  Outcome: Progressing  Goal: Improved Oral Intake  Outcome: Progressing  Goal: Optimal Pain Control and Function  Outcome: Progressing  Goal: Skin Health and Integrity  Outcome: Progressing  Goal: Optimal Wound Healing  Outcome: Progressing     Problem: Skin Injury Risk Increased  Goal: Skin Health and Integrity  Outcome: Progressing

## 2024-08-09 NOTE — PROGRESS NOTES
[facility]/Surg  [facility] Medicine  Progress Note    Patient Name: Any Duncan  MRN: [id number]  Patient Class: IP- Inpatient   Admission Date: 8/6/2024  Length of Stay: 3 days  Attending Physician: Saira Maharaj MD  Primary Care Provider: Roger Duenas III, MD        Subjective:     Principal Problem:Enteritis, enteropathogenic E. coli        HPI:  Patient is a 68 y.o. female with a medical history of hypertension, type 2 diabetes mellitus, gastroesophageal reflux disease, underlying dementia, chronic kidney disease stage IIIA, coronary artery disease status post coronary stent placement, status post permanent pacemaker placement an underlying thromboembolic phenomena probably DVT on Xarelto presents to the ER on account of bloody diarrhea over the past 2 weeks.    Patient has been at his usual state of health until about 2 weeks ago when he developed recurrence of bloody diarrhea associated with nonspecific diffuse abdominal pain.  Had episode of nonbloody vomiting recently.  But no more nausea or vomiting at this time..  No fever.  She does complain of generalized body weakness but no dizziness or loss of consciousness.  She decided to come to the ER for further evaluation.    At the ER, CT of the abdomen done was suggestive of gastroenteritis.  CBC indicates leukocytosis and hemoglobin 6.7.  She does have hypokalemia of 2.9.  With creatinine of 2.3.    Overview/Hospital Course:  08/07/2024  Pt had recent PCR GI done showed EPEC on 07/27/2024 has had diarreha since prior to this, bloody and was very weak. Found to have anemia and hypokalemia s/p 2U this am, replacing K, check mag level  08/08/2024 pt + for c. Diff, started vancomycin po  4 BM so far before noon today, tolerating clear liquids diet  08/09/2024  EPEC and C. Diff, abdominal pain today, 2 loose diarrhea stools this am.  Electrolytes have improved    Interval History:      Review of Systems   Constitutional:  Negative for  activity change, appetite change and fever.   Respiratory:  Negative for chest tightness, shortness of breath and wheezing.    Cardiovascular:  Negative for chest pain.   Gastrointestinal:  Negative for abdominal pain, constipation, diarrhea, nausea and vomiting.   Genitourinary:  Negative for dysuria.   Skin:  Negative for rash and wound.   Neurological:  Negative for tremors and headaches.     Objective:     Vital Signs (Most Recent):  Temp: 97.4 °F (36.3 °C) (08/09/24 1522)  Pulse: 64 (08/09/24 1522)  Resp: 18 (08/09/24 1522)  BP: (!) 144/63 (08/09/24 1522)  SpO2: 95 % (08/09/24 1522) Vital Signs (24h Range):  Temp:  [97.4 °F (36.3 °C)-98.8 °F (37.1 °C)] 97.4 °F (36.3 °C)  Pulse:  [59-68] 64  Resp:  [18-20] 18  SpO2:  [95 %-99 %] 95 %  BP: (126-149)/(50-98) 144/63     Weight: 49.2 kg (108 lb 6.4 oz)  Body mass index is 19.83 kg/m².    Intake/Output Summary (Last 24 hours) at 8/9/2024 1523  Last data filed at 8/9/2024 1304  Gross per 24 hour   Intake 920 ml   Output 600 ml   Net 320 ml         Physical Exam  Vitals and nursing note reviewed. Exam conducted with a chaperone present.   Constitutional:       General: She is not in acute distress.     Appearance: Normal appearance. She is not ill-appearing, toxic-appearing or diaphoretic.   Cardiovascular:      Rate and Rhythm: Normal rate and regular rhythm.      Pulses: Normal pulses.      Heart sounds: Normal heart sounds. No murmur heard.  Pulmonary:      Effort: Pulmonary effort is normal. No respiratory distress.      Breath sounds: Normal breath sounds. No wheezing, rhonchi or rales.   Abdominal:      General: Abdomen is flat. Bowel sounds are normal. There is no distension.      Palpations: Abdomen is soft.      Tenderness: There is no abdominal tenderness. There is no guarding.   Musculoskeletal:         General: No swelling or tenderness. Normal range of motion.      Right lower leg: No edema.      Left lower leg: No edema.   Skin:     General: Skin is warm  and dry.      Coloration: Skin is not jaundiced or pale.   Neurological:      General: No focal deficit present.      Mental Status: She is alert and oriented to person, place, and time. Mental status is at baseline.   Psychiatric:         Mood and Affect: Mood normal.         Behavior: Behavior normal.             Significant Labs: All pertinent labs within the past 24 hours have been reviewed.  CBC:   Recent Labs   Lab 08/08/24  0436 08/09/24 0419   WBC 10.52 8.72   HGB 10.6* 10.5*   HCT 34.4* 34.4*    253     CMP:   Recent Labs   Lab 08/08/24  0436 08/09/24 0419    135*   K 4.4 4.9   * 109   CO2 13* 15*   BUN 29* 30*   CREATININE 1.92* 1.99*   CALCIUM 8.4 8.5   ALBUMIN 2.6*  --    BILITOT 0.8  --    ALKPHOS 128  --    AST 27  --    ALT 17  --        Significant Imaging: I have reviewed all pertinent imaging results/findings within the past 24 hours.    Assessment/Plan:      * Enteritis, enteropathogenic E. coli  Continue po abx      C. difficile colitis  Continue vancomycin po      Gastrointestinal hemorrhage  Patient has acute blood loss due to hemorrhage, the hemorrhage is due to trauma/laceration, patient does not have a propensity for bleeding.. Will trend hemoglobin/hematocrit Daily, as well as monitor and correct for any coagulation defects. CBC and vital signs have been reviewed and last CBC was noted-   Lab Results   Component Value Date    WBC 8.72 08/09/2024    HGB 10.5 (L) 08/09/2024    HCT 34.4 (L) 08/09/2024    MCV 83.5 08/09/2024     08/09/2024         Will order a type and screen and consent patient for blood transfusion. Will transfuse if Hgb is <7g/dl (<8g/dl in cases of active ACS) or if patient has rapid bleeding leading to hemodynamic instability.    Hypokalemia  Patient's most recent potassium results are listed below.   Recent Labs     08/07/24  1217 08/08/24 0436 08/09/24 0419   K 4.6 4.4 4.9       Plan  - Replete potassium per protocol  - Monitor potassium  "Daily  - Patient's hypokalemia is improving  -      Hypomagnesemia  .Patient has Abnormal Magnesium: hypomagnesemia. Will continue to monitor electrolytes closely. Will replace the affected electrolytes and repeat labs to be done after interventions completed. The patient's magnesium results have been reviewed and are listed below.  No results for input(s): "MG" in the last 24 hours.       SALAZAR (acute kidney injury)  SALAZAR is likely due to pre-renal azotemia due to intravascular volume depletion. Baseline creatinine is    . Most recent creatinine and eGFR are listed below.  Recent Labs     08/07/24  1217 08/08/24  0436 08/09/24  0419   CREATININE 2.14* 1.92* 1.99*   EGFRNORACEVR 24 28 27        Plan  - SALAZAR is resolved  - Avoid nephrotoxins and renally dose meds for GFR listed above  - Monitor urine output, serial BMP, and adjust therapy as needed  -      Tobacco dependency  Dangers of cigarette smoking were reviewed with patient in detail. Patient was Counseled for 3-10 minutes. Nicotine replacement options were discussed. Nicotine replacement was discussed- prescribed      VTE Risk Mitigation (From admission, onward)           Ordered     Place sequential compression device  Until discontinued         08/06/24 9398                    Discharge Planning   JULIANNA: 8/12/2024     Code Status: Full Code   Is the patient medically ready for discharge?:     Reason for patient still in hospital (select all that apply): Patient trending condition and Treatment  Discharge Plan A: Home   Discharge Delays: None known at this time              Saira Maharaj MD  Department of Hospital Medicine   [facility]/S[facility]    "

## 2024-08-10 LAB
ANION GAP SERPL CALC-SCNC: 9 MEQ/L (ref 2–13)
BACTERIA STL CULT: NORMAL
BASOPHILS # BLD AUTO: 0.03 X10(3)/MCL (ref 0.01–0.08)
BASOPHILS NFR BLD AUTO: 0.3 % (ref 0.1–1.2)
BUN SERPL-MCNC: 30 MG/DL (ref 7–20)
CALCIUM SERPL-MCNC: 8.6 MG/DL (ref 8.4–10.2)
CHLORIDE SERPL-SCNC: 111 MMOL/L (ref 98–110)
CO2 SERPL-SCNC: 14 MMOL/L (ref 21–32)
CREAT SERPL-MCNC: 1.72 MG/DL (ref 0.66–1.25)
CREAT/UREA NIT SERPL: 17 (ref 12–20)
EOSINOPHIL # BLD AUTO: 0.07 X10(3)/MCL (ref 0.04–0.36)
EOSINOPHIL NFR BLD AUTO: 0.8 % (ref 0.7–7)
ERYTHROCYTE [DISTWIDTH] IN BLOOD BY AUTOMATED COUNT: 18.3 % (ref 11–14.5)
GFR SERPLBLD CREATININE-BSD FMLA CKD-EPI: 32 ML/MIN/1.73/M2
GLUCOSE SERPL-MCNC: 237 MG/DL (ref 70–115)
HCT VFR BLD AUTO: 34.1 % (ref 36–48)
HGB BLD-MCNC: 10.4 G/DL (ref 11.8–16)
IMM GRANULOCYTES # BLD AUTO: 0.05 X10(3)/MCL (ref 0–0.03)
IMM GRANULOCYTES NFR BLD AUTO: 0.5 % (ref 0–0.5)
LYMPHOCYTES # BLD AUTO: 0.66 X10(3)/MCL (ref 1.16–3.74)
LYMPHOCYTES NFR BLD AUTO: 7.2 % (ref 20–55)
MCH RBC QN AUTO: 25.4 PG (ref 27–34)
MCHC RBC AUTO-ENTMCNC: 30.5 G/DL (ref 31–37)
MCV RBC AUTO: 83.4 FL (ref 79–99)
MONOCYTES # BLD AUTO: 0.71 X10(3)/MCL (ref 0.24–0.36)
MONOCYTES NFR BLD AUTO: 7.8 % (ref 4.7–12.5)
NEUTROPHILS # BLD AUTO: 7.62 X10(3)/MCL (ref 1.56–6.13)
NEUTROPHILS NFR BLD AUTO: 83.4 % (ref 37–73)
NRBC BLD AUTO-RTO: 0 %
PLATELET # BLD AUTO: 251 X10(3)/MCL (ref 140–371)
PMV BLD AUTO: 10.1 FL (ref 9.4–12.4)
POCT GLUCOSE: 271 MG/DL (ref 70–110)
POCT GLUCOSE: 363 MG/DL (ref 70–110)
POCT GLUCOSE: 374 MG/DL (ref 70–110)
POCT GLUCOSE: 416 MG/DL (ref 70–110)
POTASSIUM SERPL-SCNC: 5.5 MMOL/L (ref 3.5–5.1)
RBC # BLD AUTO: 4.09 X10(6)/MCL (ref 4–5.1)
SODIUM SERPL-SCNC: 134 MMOL/L (ref 136–145)
WBC # BLD AUTO: 9.14 X10(3)/MCL (ref 4–11.5)

## 2024-08-10 PROCEDURE — 80048 BASIC METABOLIC PNL TOTAL CA: CPT | Performed by: FAMILY MEDICINE

## 2024-08-10 PROCEDURE — 36415 COLL VENOUS BLD VENIPUNCTURE: CPT | Performed by: FAMILY MEDICINE

## 2024-08-10 PROCEDURE — 85025 COMPLETE CBC W/AUTO DIFF WBC: CPT | Performed by: FAMILY MEDICINE

## 2024-08-10 PROCEDURE — 97161 PT EVAL LOW COMPLEX 20 MIN: CPT

## 2024-08-10 PROCEDURE — 99900035 HC TECH TIME PER 15 MIN (STAT)

## 2024-08-10 PROCEDURE — 97116 GAIT TRAINING THERAPY: CPT

## 2024-08-10 PROCEDURE — 25000003 PHARM REV CODE 250: Performed by: FAMILY MEDICINE

## 2024-08-10 PROCEDURE — 21400001 HC TELEMETRY ROOM

## 2024-08-10 PROCEDURE — 27000207 HC ISOLATION

## 2024-08-10 PROCEDURE — 94761 N-INVAS EAR/PLS OXIMETRY MLT: CPT

## 2024-08-10 PROCEDURE — 63600175 PHARM REV CODE 636 W HCPCS: Mod: JZ,JG | Performed by: FAMILY MEDICINE

## 2024-08-10 PROCEDURE — 25000003 PHARM REV CODE 250: Performed by: INTERNAL MEDICINE

## 2024-08-10 RX ORDER — METRONIDAZOLE 250 MG/1
500 TABLET ORAL
Status: DISCONTINUED | OUTPATIENT
Start: 2024-08-10 | End: 2024-08-12 | Stop reason: HOSPADM

## 2024-08-10 RX ADMIN — SODIUM BICARBONATE 650 MG: 650 TABLET ORAL at 08:08

## 2024-08-10 RX ADMIN — Medication: at 11:08

## 2024-08-10 RX ADMIN — FAMOTIDINE 20 MG: 10 INJECTION, SOLUTION INTRAVENOUS at 08:08

## 2024-08-10 RX ADMIN — DONEPEZIL HYDROCHLORIDE 10 MG: 10 TABLET ORAL at 08:08

## 2024-08-10 RX ADMIN — VANCOMYCIN HYDROCHLORIDE 125 MG: 125 CAPSULE ORAL at 01:08

## 2024-08-10 RX ADMIN — SODIUM BICARBONATE 650 MG: 650 TABLET ORAL at 02:08

## 2024-08-10 RX ADMIN — VANCOMYCIN HYDROCHLORIDE 125 MG: 125 CAPSULE ORAL at 08:08

## 2024-08-10 RX ADMIN — Medication 1000 UNITS: at 08:08

## 2024-08-10 RX ADMIN — Medication 324 MG: at 08:08

## 2024-08-10 RX ADMIN — INSULIN ASPART 5 UNITS: 100 INJECTION, SOLUTION INTRAVENOUS; SUBCUTANEOUS at 04:08

## 2024-08-10 RX ADMIN — INSULIN ASPART 5 UNITS: 100 INJECTION, SOLUTION INTRAVENOUS; SUBCUTANEOUS at 10:08

## 2024-08-10 RX ADMIN — INSULIN ASPART 3 UNITS: 100 INJECTION, SOLUTION INTRAVENOUS; SUBCUTANEOUS at 07:08

## 2024-08-10 RX ADMIN — MUPIROCIN: 20 OINTMENT TOPICAL at 08:08

## 2024-08-10 RX ADMIN — INSULIN ASPART 3 UNITS: 100 INJECTION, SOLUTION INTRAVENOUS; SUBCUTANEOUS at 08:08

## 2024-08-10 RX ADMIN — METOPROLOL TARTRATE 25 MG: 25 TABLET, FILM COATED ORAL at 08:08

## 2024-08-10 RX ADMIN — MONTELUKAST 10 MG: 10 TABLET, FILM COATED ORAL at 08:08

## 2024-08-10 RX ADMIN — VANCOMYCIN HYDROCHLORIDE 125 MG: 125 CAPSULE ORAL at 04:08

## 2024-08-10 RX ADMIN — METRONIDAZOLE 500 MG: 5 INJECTION, SOLUTION INTRAVENOUS at 01:08

## 2024-08-10 RX ADMIN — METRONIDAZOLE 500 MG: 250 TABLET ORAL at 06:08

## 2024-08-10 RX ADMIN — MICONAZOLE NITRATE ANTIFUNGAL POWDER: 2 POWDER TOPICAL at 08:08

## 2024-08-10 RX ADMIN — CEFTRIAXONE SODIUM 1 G: 1 INJECTION, POWDER, FOR SOLUTION INTRAMUSCULAR; INTRAVENOUS at 04:08

## 2024-08-10 RX ADMIN — Medication: at 01:08

## 2024-08-10 RX ADMIN — Medication: at 05:08

## 2024-08-10 RX ADMIN — ATORVASTATIN CALCIUM 40 MG: 40 TABLET, FILM COATED ORAL at 08:08

## 2024-08-10 RX ADMIN — METRONIDAZOLE 500 MG: 250 TABLET ORAL at 10:08

## 2024-08-10 NOTE — ASSESSMENT & PLAN NOTE
Patient's most recent potassium results are listed below.   Recent Labs     08/08/24  0436 08/09/24  0419 08/10/24  0728   K 4.4 4.9 5.5*       Plan  - Replete potassium per protocol  - Monitor potassium Daily  - Patient's hypokalemia is improving  -

## 2024-08-10 NOTE — PLAN OF CARE
Problem: Infection  Goal: Absence of Infection Signs and Symptoms  Outcome: Progressing     Problem: Adult Inpatient Plan of Care  Goal: Plan of Care Review  Outcome: Progressing  Goal: Patient-Specific Goal (Individualized)  Outcome: Progressing  Goal: Absence of Hospital-Acquired Illness or Injury  Outcome: Progressing  Goal: Optimal Comfort and Wellbeing  Outcome: Progressing  Goal: Readiness for Transition of Care  Outcome: Progressing     Problem: Fall Injury Risk  Goal: Absence of Fall and Fall-Related Injury  Outcome: Progressing     Problem: Fatigue  Goal: Improved Activity Tolerance  Outcome: Progressing     Problem: Electrolyte Imbalance  Goal: Electrolyte Balance  Outcome: Progressing     Problem: Wound  Goal: Optimal Coping  Outcome: Progressing  Goal: Optimal Functional Ability  Outcome: Progressing  Goal: Absence of Infection Signs and Symptoms  Outcome: Progressing  Goal: Improved Oral Intake  Outcome: Progressing  Goal: Optimal Pain Control and Function  Outcome: Progressing  Goal: Skin Health and Integrity  Outcome: Progressing  Goal: Optimal Wound Healing  Outcome: Progressing     Problem: Skin Injury Risk Increased  Goal: Skin Health and Integrity  Outcome: Progressing

## 2024-08-10 NOTE — SUBJECTIVE & OBJECTIVE
Interval History:      Review of Systems   Constitutional:  Negative for activity change, appetite change and fever.   Respiratory:  Negative for chest tightness, shortness of breath and wheezing.    Cardiovascular:  Negative for chest pain.   Gastrointestinal:  Positive for diarrhea. Negative for abdominal pain, constipation, nausea and vomiting.   Skin:  Negative for rash and wound.   Neurological:  Negative for headaches.     Objective:     Vital Signs (Most Recent):  Temp: 98.5 °F (36.9 °C) (08/10/24 0745)  Pulse: 60 (08/10/24 0745)  Resp: 20 (08/10/24 0745)  BP: (!) 158/63 (08/10/24 0745)  SpO2: (!) 94 % (08/10/24 0745) Vital Signs (24h Range):  Temp:  [97.2 °F (36.2 °C)-99.7 °F (37.6 °C)] 98.5 °F (36.9 °C)  Pulse:  [60-72] 60  Resp:  [18-20] 20  SpO2:  [94 %-97 %] 94 %  BP: (122-158)/(54-65) 158/63     Weight: 47 kg (103 lb 9.6 oz)  Body mass index is 18.95 kg/m².    Intake/Output Summary (Last 24 hours) at 8/10/2024 1143  Last data filed at 8/10/2024 0241  Gross per 24 hour   Intake 560 ml   Output --   Net 560 ml         Physical Exam  Vitals and nursing note reviewed. Exam conducted with a chaperone present.   Constitutional:       General: She is not in acute distress.     Appearance: Normal appearance. She is not ill-appearing, toxic-appearing or diaphoretic.   Cardiovascular:      Rate and Rhythm: Normal rate and regular rhythm.      Pulses: Normal pulses.      Heart sounds: Normal heart sounds. No murmur heard.  Pulmonary:      Effort: Pulmonary effort is normal. No respiratory distress.      Breath sounds: Normal breath sounds. No wheezing, rhonchi or rales.   Abdominal:      General: Abdomen is flat. There is no distension.      Palpations: Abdomen is soft.      Tenderness: There is no abdominal tenderness. There is no guarding.   Musculoskeletal:         General: No swelling or tenderness. Normal range of motion.      Right lower leg: No edema.      Left lower leg: No edema.   Skin:     General: Skin  is warm and dry.      Coloration: Skin is not jaundiced or pale.   Neurological:      General: No focal deficit present.      Mental Status: She is alert and oriented to person, place, and time. Mental status is at baseline.   Psychiatric:         Mood and Affect: Mood normal.         Behavior: Behavior normal.             Significant Labs: All pertinent labs within the past 24 hours have been reviewed.  CBC:   Recent Labs   Lab 08/09/24  0419 08/10/24  0728   WBC 8.72 9.14   HGB 10.5* 10.4*   HCT 34.4* 34.1*    251     CMP:   Recent Labs   Lab 08/09/24  0419 08/10/24  0728   * 134*   K 4.9 5.5*    111*   CO2 15* 14*   BUN 30* 30*   CREATININE 1.99* 1.72*   CALCIUM 8.5 8.6       Significant Imaging: I have reviewed all pertinent imaging results/findings within the past 24 hours.

## 2024-08-10 NOTE — PLAN OF CARE
Problem: Infection  Goal: Absence of Infection Signs and Symptoms  Outcome: Progressing     Problem: Adult Inpatient Plan of Care  Goal: Plan of Care Review  Outcome: Progressing  Goal: Patient-Specific Goal (Individualized)  Outcome: Progressing  Goal: Absence of Hospital-Acquired Illness or Injury  Outcome: Progressing  Goal: Optimal Comfort and Wellbeing  Outcome: Progressing  Goal: Readiness for Transition of Care  Outcome: Progressing     Problem: Fall Injury Risk  Goal: Absence of Fall and Fall-Related Injury  Outcome: Progressing     Problem: Fatigue  Goal: Improved Activity Tolerance  Outcome: Progressing     Problem: Electrolyte Imbalance  Goal: Electrolyte Balance  Outcome: Progressing     Problem: Acute Kidney Injury/Impairment  Goal: Fluid and Electrolyte Balance  Outcome: Progressing  Goal: Improved Oral Intake  Outcome: Progressing  Goal: Effective Renal Function  Outcome: Progressing     Problem: Skin Injury Risk Increased  Goal: Skin Health and Integrity  Outcome: Progressing

## 2024-08-10 NOTE — ASSESSMENT & PLAN NOTE
SALAZAR is likely due to pre-renal azotemia due to intravascular volume depletion. Baseline creatinine is    . Most recent creatinine and eGFR are listed below.  Recent Labs     08/08/24  0436 08/09/24  0419 08/10/24  0728   CREATININE 1.92* 1.99* 1.72*   EGFRNORACEVR 28 27 32        Plan  - SALAZAR is resolved  - Avoid nephrotoxins and renally dose meds for GFR listed above  - Monitor urine output, serial BMP, and adjust therapy as needed  -

## 2024-08-10 NOTE — PT/OT/SLP EVAL
"Physical Therapy Evaluation    Patient Name:  Any Duncan   MRN:  [id number]    Recommendations:     Discharge Recommendations: Low Intensity Therapy   Discharge Equipment Recommendations: bedside commode (Pt would benefit from BSC over toilet during the day and bedside at night for safety)   Barriers to discharge:  current medical status    Assessment:     Any Duncan is a 68 y.o. female admitted with a medical diagnosis of Enteritis, enteropathogenic E. coli.  She presents with the following impairments/functional limitations: weakness, impaired endurance, impaired functional mobility, gait instability, impaired balance .    Rehab Prognosis: Good; patient would benefit from acute skilled PT services to address these deficits and reach maximum level of function.    Recent Surgery: * No surgery found *      Plan:     During this hospitalization, patient to be seen 5 x/week to address the identified rehab impairments via gait training, therapeutic activities, therapeutic exercises and progress toward the following goals:    Plan of Care Expires:       Subjective     Chief Complaint: Pt reports "I walked to the bathroom earlier. I am feeling better today.  Let's walk all over this room now.  There's my walker (pointing to a rollator against the wall)."  Patient/Family Comments/goals: to rtn home  Pain/Comfort:       Patients cultural, spiritual, Gnosticism conflicts given the current situation:      Living Environment:  Pt lives with sister in AA home with 3 steps one rail to enter  Prior to admission, patients level of function was indep household amb with rollator and her family assists her getting down steps with her rollator.  Equipment used at home: shower chair, rollator.  DME owned (not currently used): none.  Upon discharge, patient will have assistance from family.    Objective:     Communicated with nurse and pt prior to session.  Patient found up in chair with eating dessert from lunch tray   upon " PT entry to room.    General Precautions: Standard, fall  Orthopedic Precautions:    Braces:    Respiratory Status: Room air    Exams:  RLE ROM: WFL  LLE ROM: WFL    Functional Mobility:  Transfers:     Sit to Stand:  stand by assistance with rollator  Gait: amb 100ft in room with rollator SBA  Balance: fair+      AM-PAC 6 CLICK MOBILITY  Total Score:16       Treatment & Education:  Tf to chair Marlee    Patient left up in chair with all lines intact, call button in reach, and chair alarm on.    GOALS:   Multidisciplinary Problems       Physical Therapy Goals          Problem: Physical Therapy    Goal Priority Disciplines Outcome Goal Variances Interventions   Physical Therapy Goal     PT, PT/OT      Description: 1.  Pt will be supervised walking in room with rollator  2.  Pt will improve all tfs to Marlee  3.  Pt will be I HEP to maintain strength and endurance while in house                       History:     Past Medical History:   Diagnosis Date    Allergy     Clotting disorder     Dementia     Diabetes mellitus     GERD (gastroesophageal reflux disease)     Glaucoma     Hypertension     Stroke        Past Surgical History:   Procedure Laterality Date    OOPHORECTOMY      Pace Maker Right     PARTIAL HYSTERECTOMY      stents      x5    WRIST FRACTURE SURGERY Bilateral        Time Tracking:     PT Received On: 08/10/24  PT Start Time: 1315     PT Stop Time: 1345  PT Total Time (min): 30 min     Billable Minutes: Evaluation 15 and Gait Training 15      08/10/2024

## 2024-08-10 NOTE — ASSESSMENT & PLAN NOTE
Patient has acute blood loss due to hemorrhage, the hemorrhage is due to trauma/laceration, patient does not have a propensity for bleeding.. Will trend hemoglobin/hematocrit Daily, as well as monitor and correct for any coagulation defects. CBC and vital signs have been reviewed and last CBC was noted-   Lab Results   Component Value Date    WBC 9.14 08/10/2024    HGB 10.4 (L) 08/10/2024    HCT 34.1 (L) 08/10/2024    MCV 83.4 08/10/2024     08/10/2024         Will order a type and screen and consent patient for blood transfusion. Will transfuse if Hgb is <7g/dl (<8g/dl in cases of active ACS) or if patient has rapid bleeding leading to hemodynamic instability.

## 2024-08-10 NOTE — PROGRESS NOTES
[facility]/Surg  [facility] Medicine  Progress Note    Patient Name: Any Duncan  MRN: [id number]  Patient Class: IP- Inpatient   Admission Date: 8/6/2024  Length of Stay: 4 days  Attending Physician: Saira Maharaj MD  Primary Care Provider: Roger Duneas III, MD        Subjective:     Principal Problem:Enteritis, enteropathogenic E. coli        HPI:  Patient is a 68 y.o. female with a medical history of hypertension, type 2 diabetes mellitus, gastroesophageal reflux disease, underlying dementia, chronic kidney disease stage IIIA, coronary artery disease status post coronary stent placement, status post permanent pacemaker placement an underlying thromboembolic phenomena probably DVT on Xarelto presents to the ER on account of bloody diarrhea over the past 2 weeks.    Patient has been at his usual state of health until about 2 weeks ago when he developed recurrence of bloody diarrhea associated with nonspecific diffuse abdominal pain.  Had episode of nonbloody vomiting recently.  But no more nausea or vomiting at this time..  No fever.  She does complain of generalized body weakness but no dizziness or loss of consciousness.  She decided to come to the ER for further evaluation.    At the ER, CT of the abdomen done was suggestive of gastroenteritis.  CBC indicates leukocytosis and hemoglobin 6.7.  She does have hypokalemia of 2.9.  With creatinine of 2.3.    Overview/Hospital Course:  08/07/2024  Pt had recent PCR GI done showed EPEC on 07/27/2024 has had diarreha since prior to this, bloody and was very weak. Found to have anemia and hypokalemia s/p 2U this am, replacing K, check mag level  08/08/2024 pt + for c. Diff, started vancomycin po  4 BM so far before noon today, tolerating clear liquids diet  08/09/2024  EPEC and C. Diff, abdominal pain today, 2 loose diarrhea stools this am.  Electrolytes have improved  08/10/2024 pt sugars high usually takes 80U of tudgjeo at home.  Still  with loose and incontinent stool weak and PT has been ordered.    Interval History:      Review of Systems   Constitutional:  Negative for activity change, appetite change and fever.   Respiratory:  Negative for chest tightness, shortness of breath and wheezing.    Cardiovascular:  Negative for chest pain.   Gastrointestinal:  Positive for diarrhea. Negative for abdominal pain, constipation, nausea and vomiting.   Skin:  Negative for rash and wound.   Neurological:  Negative for headaches.     Objective:     Vital Signs (Most Recent):  Temp: 98.5 °F (36.9 °C) (08/10/24 0745)  Pulse: 60 (08/10/24 0745)  Resp: 20 (08/10/24 0745)  BP: (!) 158/63 (08/10/24 0745)  SpO2: (!) 94 % (08/10/24 0745) Vital Signs (24h Range):  Temp:  [97.2 °F (36.2 °C)-99.7 °F (37.6 °C)] 98.5 °F (36.9 °C)  Pulse:  [60-72] 60  Resp:  [18-20] 20  SpO2:  [94 %-97 %] 94 %  BP: (122-158)/(54-65) 158/63     Weight: 47 kg (103 lb 9.6 oz)  Body mass index is 18.95 kg/m².    Intake/Output Summary (Last 24 hours) at 8/10/2024 1143  Last data filed at 8/10/2024 0241  Gross per 24 hour   Intake 560 ml   Output --   Net 560 ml         Physical Exam  Vitals and nursing note reviewed. Exam conducted with a chaperone present.   Constitutional:       General: She is not in acute distress.     Appearance: Normal appearance. She is not ill-appearing, toxic-appearing or diaphoretic.   Cardiovascular:      Rate and Rhythm: Normal rate and regular rhythm.      Pulses: Normal pulses.      Heart sounds: Normal heart sounds. No murmur heard.  Pulmonary:      Effort: Pulmonary effort is normal. No respiratory distress.      Breath sounds: Normal breath sounds. No wheezing, rhonchi or rales.   Abdominal:      General: Abdomen is flat. There is no distension.      Palpations: Abdomen is soft.      Tenderness: There is no abdominal tenderness. There is no guarding.   Musculoskeletal:         General: No swelling or tenderness. Normal range of motion.      Right lower leg:  No edema.      Left lower leg: No edema.   Skin:     General: Skin is warm and dry.      Coloration: Skin is not jaundiced or pale.   Neurological:      General: No focal deficit present.      Mental Status: She is alert and oriented to person, place, and time. Mental status is at baseline.   Psychiatric:         Mood and Affect: Mood normal.         Behavior: Behavior normal.             Significant Labs: All pertinent labs within the past 24 hours have been reviewed.  CBC:   Recent Labs   Lab 08/09/24  0419 08/10/24  0728   WBC 8.72 9.14   HGB 10.5* 10.4*   HCT 34.4* 34.1*    251     CMP:   Recent Labs   Lab 08/09/24  0419 08/10/24  0728   * 134*   K 4.9 5.5*    111*   CO2 15* 14*   BUN 30* 30*   CREATININE 1.99* 1.72*   CALCIUM 8.5 8.6       Significant Imaging: I have reviewed all pertinent imaging results/findings within the past 24 hours.    Assessment/Plan:      * Enteritis, enteropathogenic E. coli  Continue po abx      C. difficile colitis  Continue vancomycin po      Gastrointestinal hemorrhage  Patient has acute blood loss due to hemorrhage, the hemorrhage is due to trauma/laceration, patient does not have a propensity for bleeding.. Will trend hemoglobin/hematocrit Daily, as well as monitor and correct for any coagulation defects. CBC and vital signs have been reviewed and last CBC was noted-   Lab Results   Component Value Date    WBC 9.14 08/10/2024    HGB 10.4 (L) 08/10/2024    HCT 34.1 (L) 08/10/2024    MCV 83.4 08/10/2024     08/10/2024         Will order a type and screen and consent patient for blood transfusion. Will transfuse if Hgb is <7g/dl (<8g/dl in cases of active ACS) or if patient has rapid bleeding leading to hemodynamic instability.    Hypokalemia  Patient's most recent potassium results are listed below.   Recent Labs     08/08/24  0436 08/09/24  0419 08/10/24  0728   K 4.4 4.9 5.5*       Plan  - Replete potassium per protocol  - Monitor potassium Daily  -  "Patient's hypokalemia is improving  -      Hypomagnesemia  .Patient has Abnormal Magnesium: hypomagnesemia. Will continue to monitor electrolytes closely. Will replace the affected electrolytes and repeat labs to be done after interventions completed. The patient's magnesium results have been reviewed and are listed below.  No results for input(s): "MG" in the last 24 hours.       SALAZAR (acute kidney injury)  SALAZAR is likely due to pre-renal azotemia due to intravascular volume depletion. Baseline creatinine is    . Most recent creatinine and eGFR are listed below.  Recent Labs     08/08/24  0436 08/09/24  0419 08/10/24  0728   CREATININE 1.92* 1.99* 1.72*   EGFRNORACEVR 28 27 32        Plan  - SALAZAR is resolved  - Avoid nephrotoxins and renally dose meds for GFR listed above  - Monitor urine output, serial BMP, and adjust therapy as needed  -      Tobacco dependency  Dangers of cigarette smoking were reviewed with patient in detail. Patient was Counseled for 3-10 minutes. Nicotine replacement options were discussed. Nicotine replacement was discussed- prescribed      VTE Risk Mitigation (From admission, onward)           Ordered     Place sequential compression device  Until discontinued         08/06/24 2230                    Discharge Planning   JULIANNA: 8/12/2024     Code Status: Full Code   Is the patient medically ready for discharge?:     Reason for patient still in hospital (select all that apply): Patient trending condition and Treatment  Discharge Plan A: Home   Discharge Delays: None known at this time              Saira Maharaj MD  Department of Hospital Medicine   [facility]    "

## 2024-08-11 LAB
ANION GAP SERPL CALC-SCNC: 10 MEQ/L (ref 2–13)
BASOPHILS # BLD AUTO: 0.05 X10(3)/MCL (ref 0.01–0.08)
BASOPHILS NFR BLD AUTO: 0.7 % (ref 0.1–1.2)
BUN SERPL-MCNC: 31 MG/DL (ref 7–20)
CALCIUM SERPL-MCNC: 8.6 MG/DL (ref 8.4–10.2)
CHLORIDE SERPL-SCNC: 107 MMOL/L (ref 98–110)
CO2 SERPL-SCNC: 15 MMOL/L (ref 21–32)
CREAT SERPL-MCNC: 1.68 MG/DL (ref 0.66–1.25)
CREAT/UREA NIT SERPL: 18 (ref 12–20)
EOSINOPHIL # BLD AUTO: 0.12 X10(3)/MCL (ref 0.04–0.36)
EOSINOPHIL NFR BLD AUTO: 1.6 % (ref 0.7–7)
ERYTHROCYTE [DISTWIDTH] IN BLOOD BY AUTOMATED COUNT: 18.6 % (ref 11–14.5)
GFR SERPLBLD CREATININE-BSD FMLA CKD-EPI: 33 ML/MIN/1.73/M2
GLUCOSE SERPL-MCNC: 227 MG/DL (ref 70–115)
GLUCOSE SERPL-MCNC: 465 MG/DL (ref 70–115)
HCT VFR BLD AUTO: 33.1 % (ref 36–48)
HGB BLD-MCNC: 10.2 G/DL (ref 11.8–16)
IMM GRANULOCYTES # BLD AUTO: 0.05 X10(3)/MCL (ref 0–0.03)
IMM GRANULOCYTES NFR BLD AUTO: 0.7 % (ref 0–0.5)
LYMPHOCYTES # BLD AUTO: 0.87 X10(3)/MCL (ref 1.16–3.74)
LYMPHOCYTES NFR BLD AUTO: 11.3 % (ref 20–55)
MCH RBC QN AUTO: 25.6 PG (ref 27–34)
MCHC RBC AUTO-ENTMCNC: 30.8 G/DL (ref 31–37)
MCV RBC AUTO: 83.2 FL (ref 79–99)
MONOCYTES # BLD AUTO: 0.69 X10(3)/MCL (ref 0.24–0.36)
MONOCYTES NFR BLD AUTO: 9 % (ref 4.7–12.5)
NEUTROPHILS # BLD AUTO: 5.91 X10(3)/MCL (ref 1.56–6.13)
NEUTROPHILS NFR BLD AUTO: 76.7 % (ref 37–73)
NRBC BLD AUTO-RTO: 0 %
PLATELET # BLD AUTO: 248 X10(3)/MCL (ref 140–371)
PMV BLD AUTO: 10.6 FL (ref 9.4–12.4)
POCT GLUCOSE: 217 MG/DL (ref 70–110)
POCT GLUCOSE: 310 MG/DL (ref 70–110)
POCT GLUCOSE: 434 MG/DL (ref 70–110)
POCT GLUCOSE: 449 MG/DL (ref 70–110)
POCT GLUCOSE: >500 MG/DL (ref 70–110)
POCT GLUCOSE: >500 MG/DL (ref 70–110)
POTASSIUM SERPL-SCNC: 5.1 MMOL/L (ref 3.5–5.1)
RBC # BLD AUTO: 3.98 X10(6)/MCL (ref 4–5.1)
SODIUM SERPL-SCNC: 132 MMOL/L (ref 136–145)
WBC # BLD AUTO: 7.69 X10(3)/MCL (ref 4–11.5)

## 2024-08-11 PROCEDURE — 27000207 HC ISOLATION

## 2024-08-11 PROCEDURE — 25000003 PHARM REV CODE 250: Performed by: FAMILY MEDICINE

## 2024-08-11 PROCEDURE — 82947 ASSAY GLUCOSE BLOOD QUANT: CPT | Performed by: FAMILY MEDICINE

## 2024-08-11 PROCEDURE — 21400001 HC TELEMETRY ROOM

## 2024-08-11 PROCEDURE — 99900035 HC TECH TIME PER 15 MIN (STAT)

## 2024-08-11 PROCEDURE — 63600175 PHARM REV CODE 636 W HCPCS: Performed by: FAMILY MEDICINE

## 2024-08-11 PROCEDURE — 25000003 PHARM REV CODE 250: Performed by: INTERNAL MEDICINE

## 2024-08-11 PROCEDURE — 36415 COLL VENOUS BLD VENIPUNCTURE: CPT | Performed by: FAMILY MEDICINE

## 2024-08-11 PROCEDURE — 85025 COMPLETE CBC W/AUTO DIFF WBC: CPT | Performed by: FAMILY MEDICINE

## 2024-08-11 PROCEDURE — 94761 N-INVAS EAR/PLS OXIMETRY MLT: CPT

## 2024-08-11 PROCEDURE — 80048 BASIC METABOLIC PNL TOTAL CA: CPT | Performed by: FAMILY MEDICINE

## 2024-08-11 RX ORDER — INSULIN GLARGINE 100 [IU]/ML
10 INJECTION, SOLUTION SUBCUTANEOUS DAILY
Status: DISCONTINUED | OUTPATIENT
Start: 2024-08-11 | End: 2024-08-12 | Stop reason: HOSPADM

## 2024-08-11 RX ADMIN — MICONAZOLE NITRATE ANTIFUNGAL POWDER: 2 POWDER TOPICAL at 09:08

## 2024-08-11 RX ADMIN — SODIUM BICARBONATE 650 MG: 650 TABLET ORAL at 09:08

## 2024-08-11 RX ADMIN — DONEPEZIL HYDROCHLORIDE 10 MG: 10 TABLET ORAL at 09:08

## 2024-08-11 RX ADMIN — VANCOMYCIN HYDROCHLORIDE 125 MG: 125 CAPSULE ORAL at 09:08

## 2024-08-11 RX ADMIN — MUPIROCIN: 20 OINTMENT TOPICAL at 08:08

## 2024-08-11 RX ADMIN — Medication: at 02:08

## 2024-08-11 RX ADMIN — METRONIDAZOLE 500 MG: 250 TABLET ORAL at 10:08

## 2024-08-11 RX ADMIN — VANCOMYCIN HYDROCHLORIDE 125 MG: 125 CAPSULE ORAL at 11:08

## 2024-08-11 RX ADMIN — INSULIN ASPART 2 UNITS: 100 INJECTION, SOLUTION INTRAVENOUS; SUBCUTANEOUS at 07:08

## 2024-08-11 RX ADMIN — MONTELUKAST 10 MG: 10 TABLET, FILM COATED ORAL at 09:08

## 2024-08-11 RX ADMIN — ATORVASTATIN CALCIUM 40 MG: 40 TABLET, FILM COATED ORAL at 09:08

## 2024-08-11 RX ADMIN — METRONIDAZOLE 500 MG: 250 TABLET ORAL at 04:08

## 2024-08-11 RX ADMIN — INSULIN GLARGINE 10 UNITS: 100 INJECTION, SOLUTION SUBCUTANEOUS at 12:08

## 2024-08-11 RX ADMIN — MICONAZOLE NITRATE ANTIFUNGAL POWDER: 2 POWDER TOPICAL at 08:08

## 2024-08-11 RX ADMIN — Medication 324 MG: at 08:08

## 2024-08-11 RX ADMIN — SODIUM BICARBONATE 650 MG: 650 TABLET ORAL at 08:08

## 2024-08-11 RX ADMIN — Medication: at 06:08

## 2024-08-11 RX ADMIN — METOPROLOL TARTRATE 25 MG: 25 TABLET, FILM COATED ORAL at 09:08

## 2024-08-11 RX ADMIN — VANCOMYCIN HYDROCHLORIDE 125 MG: 125 CAPSULE ORAL at 04:08

## 2024-08-11 RX ADMIN — Medication 1000 UNITS: at 08:08

## 2024-08-11 RX ADMIN — CEFTRIAXONE SODIUM 1 G: 1 INJECTION, POWDER, FOR SOLUTION INTRAMUSCULAR; INTRAVENOUS at 04:08

## 2024-08-11 RX ADMIN — Medication: at 09:08

## 2024-08-11 RX ADMIN — INSULIN ASPART 4 UNITS: 100 INJECTION, SOLUTION INTRAVENOUS; SUBCUTANEOUS at 10:08

## 2024-08-11 RX ADMIN — FAMOTIDINE 20 MG: 10 INJECTION, SOLUTION INTRAVENOUS at 08:08

## 2024-08-11 RX ADMIN — METRONIDAZOLE 500 MG: 250 TABLET ORAL at 06:08

## 2024-08-11 RX ADMIN — METOPROLOL TARTRATE 25 MG: 25 TABLET, FILM COATED ORAL at 08:08

## 2024-08-11 RX ADMIN — SODIUM BICARBONATE 650 MG: 650 TABLET ORAL at 02:08

## 2024-08-11 RX ADMIN — INSULIN ASPART 3 UNITS: 100 INJECTION, SOLUTION INTRAVENOUS; SUBCUTANEOUS at 08:08

## 2024-08-11 RX ADMIN — INSULIN ASPART 5 UNITS: 100 INJECTION, SOLUTION INTRAVENOUS; SUBCUTANEOUS at 04:08

## 2024-08-11 RX ADMIN — MUPIROCIN: 20 OINTMENT TOPICAL at 09:08

## 2024-08-11 RX ADMIN — VANCOMYCIN HYDROCHLORIDE 125 MG: 125 CAPSULE ORAL at 08:08

## 2024-08-11 NOTE — PLAN OF CARE
Problem: Infection  Goal: Absence of Infection Signs and Symptoms  Outcome: Progressing     Problem: Adult Inpatient Plan of Care  Goal: Plan of Care Review  Outcome: Progressing  Goal: Patient-Specific Goal (Individualized)  Outcome: Progressing  Goal: Absence of Hospital-Acquired Illness or Injury  Outcome: Progressing  Goal: Optimal Comfort and Wellbeing  Outcome: Progressing  Goal: Readiness for Transition of Care  Outcome: Progressing     Problem: Fall Injury Risk  Goal: Absence of Fall and Fall-Related Injury  Outcome: Progressing     Problem: Fatigue  Goal: Improved Activity Tolerance  Outcome: Progressing     Problem: Electrolyte Imbalance  Goal: Electrolyte Balance  Outcome: Progressing     Problem: Wound  Goal: Optimal Coping  Outcome: Progressing  Goal: Optimal Functional Ability  Outcome: Progressing  Goal: Absence of Infection Signs and Symptoms  Outcome: Progressing  Goal: Improved Oral Intake  Outcome: Progressing  Goal: Optimal Pain Control and Function  Outcome: Progressing  Goal: Skin Health and Integrity  Outcome: Progressing  Goal: Optimal Wound Healing  Outcome: Progressing     Problem: Acute Kidney Injury/Impairment  Goal: Fluid and Electrolyte Balance  Outcome: Progressing  Goal: Improved Oral Intake  Outcome: Progressing  Goal: Effective Renal Function  Outcome: Progressing     Problem: Skin Injury Risk Increased  Goal: Skin Health and Integrity  Outcome: Progressing

## 2024-08-11 NOTE — PROGRESS NOTES
[facility]/Surg  [facility] Medicine  Progress Note    Patient Name: Any Duncan  MRN: [id number]  Patient Class: IP- Inpatient   Admission Date: 8/6/2024  Length of Stay: 5 days  Attending Physician: Saira Maharaj MD  Primary Care Provider: Roger Duenas III, MD        Subjective:     Principal Problem:Enteritis, enteropathogenic E. coli        HPI:  Patient is a 68 y.o. female with a medical history of hypertension, type 2 diabetes mellitus, gastroesophageal reflux disease, underlying dementia, chronic kidney disease stage IIIA, coronary artery disease status post coronary stent placement, status post permanent pacemaker placement an underlying thromboembolic phenomena probably DVT on Xarelto presents to the ER on account of bloody diarrhea over the past 2 weeks.    Patient has been at his usual state of health until about 2 weeks ago when he developed recurrence of bloody diarrhea associated with nonspecific diffuse abdominal pain.  Had episode of nonbloody vomiting recently.  But no more nausea or vomiting at this time..  No fever.  She does complain of generalized body weakness but no dizziness or loss of consciousness.  She decided to come to the ER for further evaluation.    At the ER, CT of the abdomen done was suggestive of gastroenteritis.  CBC indicates leukocytosis and hemoglobin 6.7.  She does have hypokalemia of 2.9.  With creatinine of 2.3.    Overview/Hospital Course:  08/07/2024  Pt had recent PCR GI done showed EPEC on 07/27/2024 has had diarreha since prior to this, bloody and was very weak. Found to have anemia and hypokalemia s/p 2U this am, replacing K, check mag level  08/08/2024 pt + for c. Diff, started vancomycin po  4 BM so far before noon today, tolerating clear liquids diet  08/09/2024  EPEC and C. Diff, abdominal pain today, 2 loose diarrhea stools this am.  Electrolytes have improved  08/10/2024 pt sugars high usually takes 80U of tudgjeo at home.  Still  with loose and incontinent stool weak and PT has been ordered.  08/11/2024 pt awake and alert, diarrhea is a bit less, sugars still high, started on lantus today working with PT.      Interval History:      Review of Systems   Constitutional:  Negative for activity change, appetite change and fever.   Respiratory:  Negative for chest tightness, shortness of breath and wheezing.    Cardiovascular:  Negative for chest pain.   Gastrointestinal:  Positive for diarrhea. Negative for abdominal pain, constipation, nausea and vomiting.   Skin:  Negative for rash and wound.   Neurological:  Negative for headaches.     Objective:     Vital Signs (Most Recent):  Temp: 97.2 °F (36.2 °C) (08/11/24 1101)  Pulse: 60 (08/11/24 1101)  Resp: 18 (08/11/24 1101)  BP: (!) 83/55 (08/11/24 1101)  SpO2: 95 % (08/11/24 1101) Vital Signs (24h Range):  Temp:  [97.2 °F (36.2 °C)-98.7 °F (37.1 °C)] 97.2 °F (36.2 °C)  Pulse:  [60-66] 60  Resp:  [16-20] 18  SpO2:  [92 %-99 %] 95 %  BP: ()/(55-73) 83/55     Weight: 48.9 kg (107 lb 12.8 oz)  Body mass index is 19.72 kg/m².    Intake/Output Summary (Last 24 hours) at 8/11/2024 1504  Last data filed at 8/11/2024 0827  Gross per 24 hour   Intake 900 ml   Output --   Net 900 ml         Physical Exam  Vitals and nursing note reviewed. Exam conducted with a chaperone present.   Constitutional:       General: She is not in acute distress.     Appearance: Normal appearance. She is not ill-appearing, toxic-appearing or diaphoretic.   Cardiovascular:      Rate and Rhythm: Normal rate and regular rhythm.      Pulses: Normal pulses.      Heart sounds: Normal heart sounds. No murmur heard.  Pulmonary:      Effort: Pulmonary effort is normal. No respiratory distress.      Breath sounds: Normal breath sounds. No wheezing, rhonchi or rales.   Abdominal:      General: Abdomen is flat. There is no distension.      Palpations: Abdomen is soft.      Tenderness: There is no abdominal tenderness. There is no  guarding.   Musculoskeletal:         General: No swelling or tenderness. Normal range of motion.      Right lower leg: No edema.      Left lower leg: No edema.   Skin:     General: Skin is warm and dry.      Coloration: Skin is not jaundiced or pale.   Neurological:      General: No focal deficit present.      Mental Status: She is alert and oriented to person, place, and time. Mental status is at baseline.   Psychiatric:         Mood and Affect: Mood normal.         Behavior: Behavior normal.             Significant Labs: All pertinent labs within the past 24 hours have been reviewed.  CBC:   Recent Labs   Lab 08/10/24  0728 08/11/24  0535   WBC 9.14 7.69   HGB 10.4* 10.2*   HCT 34.1* 33.1*    248     CMP:   Recent Labs   Lab 08/10/24  0728 08/11/24  0535   * 132*   K 5.5* 5.1   * 107   CO2 14* 15*   BUN 30* 31*   CREATININE 1.72* 1.68*   CALCIUM 8.6 8.6       Significant Imaging: I have reviewed all pertinent imaging results/findings within the past 24 hours.    Assessment/Plan:      * Enteritis, enteropathogenic E. coli  Continue po abx      C. difficile colitis  Continue vancomycin po      Gastrointestinal hemorrhage  Patient has acute blood loss due to hemorrhage, the hemorrhage is due to trauma/laceration, patient does not have a propensity for bleeding.. Will trend hemoglobin/hematocrit Daily, as well as monitor and correct for any coagulation defects. CBC and vital signs have been reviewed and last CBC was noted-   Lab Results   Component Value Date    WBC 7.69 08/11/2024    HGB 10.2 (L) 08/11/2024    HCT 33.1 (L) 08/11/2024    MCV 83.2 08/11/2024     08/11/2024         Will order a type and screen and consent patient for blood transfusion. Will transfuse if Hgb is <7g/dl (<8g/dl in cases of active ACS) or if patient has rapid bleeding leading to hemodynamic instability.    Hypokalemia  Patient's most recent potassium results are listed below.   Recent Labs     08/09/24  0413  "08/10/24  0728 08/11/24  0535   K 4.9 5.5* 5.1       Plan  - Replete potassium per protocol  - Monitor potassium Daily  - Patient's hypokalemia is improving  -      Hypomagnesemia  .Patient has Abnormal Magnesium: hypomagnesemia. Will continue to monitor electrolytes closely. Will replace the affected electrolytes and repeat labs to be done after interventions completed. The patient's magnesium results have been reviewed and are listed below.  No results for input(s): "MG" in the last 24 hours.       SALAZAR (acute kidney injury)  SALAZAR is likely due to pre-renal azotemia due to intravascular volume depletion. Baseline creatinine is    . Most recent creatinine and eGFR are listed below.  Recent Labs     08/09/24  0419 08/10/24  0728 08/11/24  0535   CREATININE 1.99* 1.72* 1.68*   EGFRNORACEVR 27 32 33        Plan  - SALAZAR is resolved  - Avoid nephrotoxins and renally dose meds for GFR listed above  - Monitor urine output, serial BMP, and adjust therapy as needed  -      Tobacco dependency  Dangers of cigarette smoking were reviewed with patient in detail. Patient was Counseled for 3-10 minutes. Nicotine replacement options were discussed. Nicotine replacement was discussed- prescribed      VTE Risk Mitigation (From admission, onward)           Ordered     Place sequential compression device  Until discontinued         08/06/24 2237                    Discharge Planning   JULIANNA: 8/12/2024     Code Status: Full Code   Is the patient medically ready for discharge?:     Reason for patient still in hospital (select all that apply): Patient trending condition, Laboratory test, and Treatment  Discharge Plan A: Home   Discharge Delays: None known at this time              Saira Maharaj MD  Department of Hospital Medicine   [facility]    "

## 2024-08-11 NOTE — ASSESSMENT & PLAN NOTE
SALAZAR is likely due to pre-renal azotemia due to intravascular volume depletion. Baseline creatinine is    . Most recent creatinine and eGFR are listed below.  Recent Labs     08/09/24  0419 08/10/24  0728 08/11/24  0535   CREATININE 1.99* 1.72* 1.68*   EGFRNORACEVR 27 32 33        Plan  - SALAZAR is resolved  - Avoid nephrotoxins and renally dose meds for GFR listed above  - Monitor urine output, serial BMP, and adjust therapy as needed  -

## 2024-08-11 NOTE — ASSESSMENT & PLAN NOTE
Patient's most recent potassium results are listed below.   Recent Labs     08/09/24  0419 08/10/24  0728 08/11/24  0535   K 4.9 5.5* 5.1       Plan  - Replete potassium per protocol  - Monitor potassium Daily  - Patient's hypokalemia is improving  -

## 2024-08-11 NOTE — PLAN OF CARE
Problem: Infection  Goal: Absence of Infection Signs and Symptoms  Outcome: Progressing     Problem: Adult Inpatient Plan of Care  Goal: Plan of Care Review  Outcome: Progressing  Goal: Patient-Specific Goal (Individualized)  Outcome: Progressing  Goal: Absence of Hospital-Acquired Illness or Injury  Outcome: Progressing  Goal: Optimal Comfort and Wellbeing  Outcome: Progressing  Goal: Readiness for Transition of Care  Outcome: Progressing     Problem: Electrolyte Imbalance  Goal: Electrolyte Balance  Outcome: Progressing     Problem: Wound  Goal: Optimal Coping  Outcome: Progressing  Goal: Optimal Functional Ability  Outcome: Progressing  Goal: Absence of Infection Signs and Symptoms  Outcome: Progressing  Goal: Improved Oral Intake  Outcome: Progressing  Goal: Optimal Pain Control and Function  Outcome: Progressing  Goal: Skin Health and Integrity  Outcome: Progressing  Goal: Optimal Wound Healing  Outcome: Progressing     Problem: Skin Injury Risk Increased  Goal: Skin Health and Integrity  Outcome: Progressing

## 2024-08-11 NOTE — ASSESSMENT & PLAN NOTE
Patient has acute blood loss due to hemorrhage, the hemorrhage is due to trauma/laceration, patient does not have a propensity for bleeding.. Will trend hemoglobin/hematocrit Daily, as well as monitor and correct for any coagulation defects. CBC and vital signs have been reviewed and last CBC was noted-   Lab Results   Component Value Date    WBC 7.69 08/11/2024    HGB 10.2 (L) 08/11/2024    HCT 33.1 (L) 08/11/2024    MCV 83.2 08/11/2024     08/11/2024         Will order a type and screen and consent patient for blood transfusion. Will transfuse if Hgb is <7g/dl (<8g/dl in cases of active ACS) or if patient has rapid bleeding leading to hemodynamic instability.

## 2024-08-11 NOTE — SUBJECTIVE & OBJECTIVE
Interval History:      Review of Systems   Constitutional:  Negative for activity change, appetite change and fever.   Respiratory:  Negative for chest tightness, shortness of breath and wheezing.    Cardiovascular:  Negative for chest pain.   Gastrointestinal:  Positive for diarrhea. Negative for abdominal pain, constipation, nausea and vomiting.   Skin:  Negative for rash and wound.   Neurological:  Negative for headaches.     Objective:     Vital Signs (Most Recent):  Temp: 97.2 °F (36.2 °C) (08/11/24 1101)  Pulse: 60 (08/11/24 1101)  Resp: 18 (08/11/24 1101)  BP: (!) 83/55 (08/11/24 1101)  SpO2: 95 % (08/11/24 1101) Vital Signs (24h Range):  Temp:  [97.2 °F (36.2 °C)-98.7 °F (37.1 °C)] 97.2 °F (36.2 °C)  Pulse:  [60-66] 60  Resp:  [16-20] 18  SpO2:  [92 %-99 %] 95 %  BP: ()/(55-73) 83/55     Weight: 48.9 kg (107 lb 12.8 oz)  Body mass index is 19.72 kg/m².    Intake/Output Summary (Last 24 hours) at 8/11/2024 1504  Last data filed at 8/11/2024 0827  Gross per 24 hour   Intake 900 ml   Output --   Net 900 ml         Physical Exam  Vitals and nursing note reviewed. Exam conducted with a chaperone present.   Constitutional:       General: She is not in acute distress.     Appearance: Normal appearance. She is not ill-appearing, toxic-appearing or diaphoretic.   Cardiovascular:      Rate and Rhythm: Normal rate and regular rhythm.      Pulses: Normal pulses.      Heart sounds: Normal heart sounds. No murmur heard.  Pulmonary:      Effort: Pulmonary effort is normal. No respiratory distress.      Breath sounds: Normal breath sounds. No wheezing, rhonchi or rales.   Abdominal:      General: Abdomen is flat. There is no distension.      Palpations: Abdomen is soft.      Tenderness: There is no abdominal tenderness. There is no guarding.   Musculoskeletal:         General: No swelling or tenderness. Normal range of motion.      Right lower leg: No edema.      Left lower leg: No edema.   Skin:     General: Skin is  warm and dry.      Coloration: Skin is not jaundiced or pale.   Neurological:      General: No focal deficit present.      Mental Status: She is alert and oriented to person, place, and time. Mental status is at baseline.   Psychiatric:         Mood and Affect: Mood normal.         Behavior: Behavior normal.             Significant Labs: All pertinent labs within the past 24 hours have been reviewed.  CBC:   Recent Labs   Lab 08/10/24  0728 08/11/24  0535   WBC 9.14 7.69   HGB 10.4* 10.2*   HCT 34.1* 33.1*    248     CMP:   Recent Labs   Lab 08/10/24  0728 08/11/24  0535   * 132*   K 5.5* 5.1   * 107   CO2 14* 15*   BUN 30* 31*   CREATININE 1.72* 1.68*   CALCIUM 8.6 8.6       Significant Imaging: I have reviewed all pertinent imaging results/findings within the past 24 hours.

## 2024-08-12 VITALS
RESPIRATION RATE: 20 BRPM | BODY MASS INDEX: 20.06 KG/M2 | WEIGHT: 109 LBS | HEIGHT: 62 IN | SYSTOLIC BLOOD PRESSURE: 173 MMHG | HEART RATE: 66 BPM | TEMPERATURE: 98 F | OXYGEN SATURATION: 95 % | DIASTOLIC BLOOD PRESSURE: 71 MMHG

## 2024-08-12 LAB
ANION GAP SERPL CALC-SCNC: 9 MEQ/L (ref 2–13)
BASOPHILS # BLD AUTO: 0.04 X10(3)/MCL (ref 0.01–0.08)
BASOPHILS NFR BLD AUTO: 0.6 % (ref 0.1–1.2)
BUN SERPL-MCNC: 33 MG/DL (ref 7–20)
CALCIUM SERPL-MCNC: 8.7 MG/DL (ref 8.4–10.2)
CHLORIDE SERPL-SCNC: 107 MMOL/L (ref 98–110)
CO2 SERPL-SCNC: 15 MMOL/L (ref 21–32)
CREAT SERPL-MCNC: 1.55 MG/DL (ref 0.66–1.25)
CREAT/UREA NIT SERPL: 21 (ref 12–20)
EOSINOPHIL # BLD AUTO: 0.1 X10(3)/MCL (ref 0.04–0.36)
EOSINOPHIL NFR BLD AUTO: 1.4 % (ref 0.7–7)
ERYTHROCYTE [DISTWIDTH] IN BLOOD BY AUTOMATED COUNT: 18.4 % (ref 11–14.5)
GFR SERPLBLD CREATININE-BSD FMLA CKD-EPI: 36 ML/MIN/1.73/M2
GLUCOSE SERPL-MCNC: 292 MG/DL (ref 70–115)
HCT VFR BLD AUTO: 34.6 % (ref 36–48)
HGB BLD-MCNC: 10.6 G/DL (ref 11.8–16)
IMM GRANULOCYTES # BLD AUTO: 0.06 X10(3)/MCL (ref 0–0.03)
IMM GRANULOCYTES NFR BLD AUTO: 0.8 % (ref 0–0.5)
LYMPHOCYTES # BLD AUTO: 0.78 X10(3)/MCL (ref 1.16–3.74)
LYMPHOCYTES NFR BLD AUTO: 10.8 % (ref 20–55)
MCH RBC QN AUTO: 25.2 PG (ref 27–34)
MCHC RBC AUTO-ENTMCNC: 30.6 G/DL (ref 31–37)
MCV RBC AUTO: 82.4 FL (ref 79–99)
MONOCYTES # BLD AUTO: 0.68 X10(3)/MCL (ref 0.24–0.36)
MONOCYTES NFR BLD AUTO: 9.4 % (ref 4.7–12.5)
NEUTROPHILS # BLD AUTO: 5.59 X10(3)/MCL (ref 1.56–6.13)
NEUTROPHILS NFR BLD AUTO: 77 % (ref 37–73)
NRBC BLD AUTO-RTO: 0 %
PLATELET # BLD AUTO: 253 X10(3)/MCL (ref 140–371)
PMV BLD AUTO: 10.2 FL (ref 9.4–12.4)
POCT GLUCOSE: 282 MG/DL (ref 70–110)
POCT GLUCOSE: 328 MG/DL (ref 70–110)
POCT GLUCOSE: 444 MG/DL (ref 70–110)
POTASSIUM SERPL-SCNC: 5.1 MMOL/L (ref 3.5–5.1)
RBC # BLD AUTO: 4.2 X10(6)/MCL (ref 4–5.1)
SODIUM SERPL-SCNC: 131 MMOL/L (ref 136–145)
WBC # BLD AUTO: 7.25 X10(3)/MCL (ref 4–11.5)

## 2024-08-12 PROCEDURE — 80048 BASIC METABOLIC PNL TOTAL CA: CPT | Performed by: FAMILY MEDICINE

## 2024-08-12 PROCEDURE — 99900035 HC TECH TIME PER 15 MIN (STAT)

## 2024-08-12 PROCEDURE — 97116 GAIT TRAINING THERAPY: CPT

## 2024-08-12 PROCEDURE — 63600175 PHARM REV CODE 636 W HCPCS: Performed by: FAMILY MEDICINE

## 2024-08-12 PROCEDURE — 94761 N-INVAS EAR/PLS OXIMETRY MLT: CPT

## 2024-08-12 PROCEDURE — 25000003 PHARM REV CODE 250: Performed by: FAMILY MEDICINE

## 2024-08-12 PROCEDURE — 25000003 PHARM REV CODE 250: Performed by: INTERNAL MEDICINE

## 2024-08-12 PROCEDURE — 36415 COLL VENOUS BLD VENIPUNCTURE: CPT | Performed by: FAMILY MEDICINE

## 2024-08-12 PROCEDURE — 85025 COMPLETE CBC W/AUTO DIFF WBC: CPT | Performed by: FAMILY MEDICINE

## 2024-08-12 PROCEDURE — 97110 THERAPEUTIC EXERCISES: CPT

## 2024-08-12 RX ORDER — VANCOMYCIN HYDROCHLORIDE 125 MG/1
125 CAPSULE ORAL 4 TIMES DAILY
Qty: 24 CAPSULE | Refills: 0 | Status: SHIPPED | OUTPATIENT
Start: 2024-08-12 | End: 2024-08-18

## 2024-08-12 RX ORDER — CEFDINIR 300 MG/1
300 CAPSULE ORAL 2 TIMES DAILY
Qty: 14 CAPSULE | Refills: 0 | Status: SHIPPED | OUTPATIENT
Start: 2024-08-12 | End: 2024-08-19

## 2024-08-12 RX ORDER — INSULIN ASPART 100 [IU]/ML
0-10 INJECTION, SOLUTION INTRAVENOUS; SUBCUTANEOUS
Status: DISCONTINUED | OUTPATIENT
Start: 2024-08-12 | End: 2024-08-12 | Stop reason: HOSPADM

## 2024-08-12 RX ADMIN — MICONAZOLE NITRATE ANTIFUNGAL POWDER: 2 POWDER TOPICAL at 09:08

## 2024-08-12 RX ADMIN — FAMOTIDINE 20 MG: 10 INJECTION, SOLUTION INTRAVENOUS at 09:08

## 2024-08-12 RX ADMIN — VANCOMYCIN HYDROCHLORIDE 125 MG: 125 CAPSULE ORAL at 11:08

## 2024-08-12 RX ADMIN — VANCOMYCIN HYDROCHLORIDE 125 MG: 125 CAPSULE ORAL at 09:08

## 2024-08-12 RX ADMIN — SODIUM BICARBONATE 650 MG: 650 TABLET ORAL at 09:08

## 2024-08-12 RX ADMIN — METRONIDAZOLE 500 MG: 250 TABLET ORAL at 04:08

## 2024-08-12 RX ADMIN — INSULIN GLARGINE 10 UNITS: 100 INJECTION, SOLUTION SUBCUTANEOUS at 09:08

## 2024-08-12 RX ADMIN — INSULIN ASPART 3 UNITS: 100 INJECTION, SOLUTION INTRAVENOUS; SUBCUTANEOUS at 12:08

## 2024-08-12 RX ADMIN — Medication 1000 UNITS: at 09:08

## 2024-08-12 RX ADMIN — Medication 324 MG: at 09:08

## 2024-08-12 RX ADMIN — INSULIN ASPART 6 UNITS: 100 INJECTION, SOLUTION INTRAVENOUS; SUBCUTANEOUS at 09:08

## 2024-08-12 RX ADMIN — METRONIDAZOLE 500 MG: 250 TABLET ORAL at 11:08

## 2024-08-12 RX ADMIN — METOPROLOL TARTRATE 25 MG: 25 TABLET, FILM COATED ORAL at 09:08

## 2024-08-12 RX ADMIN — INSULIN ASPART 8 UNITS: 100 INJECTION, SOLUTION INTRAVENOUS; SUBCUTANEOUS at 11:08

## 2024-08-12 NOTE — PLAN OF CARE
Problem: Physical Therapy  Goal: Physical Therapy Goal  Description: 1.  Pt will be supervised walking in room with rollator  2.  Pt will improve all tfs to Marlee  3.  Pt will be I HEP to maintain strength and endurance while in house  Outcome: Met

## 2024-08-12 NOTE — PT/OT/SLP DISCHARGE
Physical Therapy Discharge Summary    Name: Any Duncan  MRN: [id number]   Principal Problem: Enteritis, enteropathogenic E. coli     Patient Discharged from acute Physical Therapy on 8/12/24.  Please refer to prior PT noted date on 8/12/24 for functional status.     Assessment:     Goals partially met.    Objective:     GOALS:   Multidisciplinary Problems       Physical Therapy Goals       Not on file              Multidisciplinary Problems (Resolved)          Problem: Physical Therapy    Goal Priority Disciplines Outcome Goal Variances Interventions   Physical Therapy Goal   (Resolved)     PT, PT/OT Met     Description: 1.  Pt will be supervised walking in room with rollator  2.  Pt will improve all tfs to Marlee  3.  Pt will be I HEP to maintain strength and endurance while in house                       Reasons for Discontinuation of Therapy Services  Satisfactory goal achievement.      Plan:     Patient Discharged to: Home with Home Health Service.      8/12/2024

## 2024-08-12 NOTE — PLAN OF CARE
08/12/24 1307   Medicare Message   Important Message from Medicare regarding Discharge Appeal Rights Given to patient/caregiver;Signed/date by patient/caregiver;Explained to patient/caregiver   Date IMM was signed 08/06/24   Time IMM was signed [id number]

## 2024-08-12 NOTE — DISCHARGE SUMMARY
"Hospital Medicine  Discharge Summary    Patient Name: Any Duncan  MRN: [id number]  Admit Date: 8/6/2024  Discharge Date:  8/12/2024  Status: IP- Inpatient   Length of Stay: 6      PHYSICIANS   Admitting Physician: Ciro Art MD  Discharging Physician: Ciro Art MD.  Primary Care Physician: Rgoer Duenas III, MD        DISCHARGE DIAGNOSES   Enteritis, enteropathogenic E. coli  Continue po abx     E Coli UTI     C. difficile colitis  Continue vancomycin po        Gastrointestinal hemorrhage  Patient has acute blood loss due to hemorrhage, the hemorrhage is due to trauma/laceration, patient does not have a propensity for bleeding.. Will trend hemoglobin/hematocrit Daily, as well as monitor and correct for any coagulation defects. CBC and vital signs have been reviewed and last CBC was noted-         Lab Results   Component Value Date     WBC 7.69 08/11/2024     HGB 10.2 (L) 08/11/2024     HCT 33.1 (L) 08/11/2024     MCV 83.2 08/11/2024      08/11/2024            Will order a type and screen and consent patient for blood transfusion. Will transfuse if Hgb is <7g/dl (<8g/dl in cases of active ACS) or if patient has rapid bleeding leading to hemodynamic instability.     Hypokalemia  Patient's most recent potassium results are listed below.         Recent Labs     08/09/24  0419 08/10/24  0728 08/11/24  0535   K 4.9 5.5* 5.1         Plan  - Replete potassium per protocol  - Monitor potassium Daily  - Patient's hypokalemia is improving  -       Hypomagnesemia  .Patient has Abnormal Magnesium: hypomagnesemia. Will continue to monitor electrolytes closely. Will replace the affected electrolytes and repeat labs to be done after interventions completed. The patient's magnesium results have been reviewed and are listed below.  No results for input(s): "MG" in the last 24 hours.        SALAZAR (acute kidney injury)  SALAZAR is likely due to pre-renal azotemia due to intravascular volume depletion. " Baseline creatinine is    . Most recent creatinine and eGFR are listed below.        Recent Labs     08/09/24  0419 08/10/24  0728 08/11/24  0535   CREATININE 1.99* 1.72* 1.68*   EGFRNORACEVR 27 32 33          Plan  - SALAZAR is resolved  - Avoid nephrotoxins and renally dose meds for GFR listed above  - Monitor urine output, serial BMP, and adjust therapy as needed  -       Tobacco dependency  Dangers of cigarette smoking were reviewed with patient in detail. Patient was Counseled for 3-10 minutes. Nicotine replacement options were discussed. Nicotine replacement was discussed- prescribed            PROCEDURES   * No surgery found *         HOSPITAL COURSE    Principal Problem:Enteritis, enteropathogenic E. coli           HPI:  Patient is a 68 y.o. female with a medical history of hypertension, type 2 diabetes mellitus, gastroesophageal reflux disease, underlying dementia, chronic kidney disease stage IIIA, coronary artery disease status post coronary stent placement, status post permanent pacemaker placement an underlying thromboembolic phenomena probably DVT on Xarelto presents to the ER on account of bloody diarrhea over the past 2 weeks.    Patient has been at his usual state of health until about 2 weeks ago when he developed recurrence of bloody diarrhea associated with nonspecific diffuse abdominal pain.  Had episode of nonbloody vomiting recently.  But no more nausea or vomiting at this time..  No fever.  She does complain of generalized body weakness but no dizziness or loss of consciousness.  She decided to come to the ER for further evaluation.    At the ER, CT of the abdomen done was suggestive of gastroenteritis.  CBC indicates leukocytosis and hemoglobin 6.7.  She does have hypokalemia of 2.9.  With creatinine of 2.3.     Overview/Hospital Course:  08/07/2024  Pt had recent PCR GI done showed EPEC on 07/27/2024 has had diarreha since prior to this, bloody and was very weak. Found to have anemia and  "hypokalemia s/p 2U this am, replacing K, check mag level  08/08/2024 pt + for c. Diff, started vancomycin po  4 BM so far before noon today, tolerating clear liquids diet  08/09/2024  EPEC and C. Diff, abdominal pain today, 2 loose diarrhea stools this am.  Electrolytes have improved  08/10/2024 pt sugars high usually takes 80U of tudgjeo at home.  Still with loose and incontinent stool weak and PT has been ordered.  08/11/2024 pt awake and alert, diarrhea is a bit less, sugars still high, started on lantus today working with PT.     08/12/2024  Stable for d/c home today   Will cont po vancomycin for anotehr 6 days for c diff infection  Cefdinir for e coli UTI    STATUS  Improved, Stable    DISPOSITION  Discharge to home     DIET  Cardiac     ACTIVITY  As tolerated      FOLLOW-UP      Follow-up Information       Roger Duenas III, MD Follow up.    Specialty: Family Medicine  Why: In one week  Contact information:  [address] [id number] [phone]                               DISCHARGE MEDICATION RECONCILIATION        Medication List        START taking these medications      cefdinir 300 MG capsule  Commonly known as: OMNICEF  Take 1 capsule (300 mg total) by mouth 2 (two) times a day. for 7 days     vancomycin 125 MG capsule  Commonly known as: VANCOCIN  Take 1 capsule (125 mg total) by mouth 4 (four) times daily. for 6 days            CONTINUE taking these medications      atorvastatin 40 MG tablet  Commonly known as: LIPITOR     BD ULTRA-FINE SHORT PEN NEEDLE 31 gauge x 5/16" Ndle  Generic drug: pen needle, diabetic     CINNAMON 500 mg capsule  Generic drug: cinnamon bark     diphenoxylate-atropine 2.5-0.025 mg 2.5-0.025 mg per tablet  Commonly known as: LOMOTIL     donepeziL 10 MG tablet  Commonly known as: ARICEPT     enalapril 10 MG tablet  Commonly known as: VASOTEC     fish oil-omega-3 fatty acids 300-1,000 mg capsule     FLUoxetine 10 MG capsule     insulin aspart U-100 100 " unit/mL injection  Commonly known as: NovoLOG     * IRON 100 PLUS ORAL     * IRON 100 PLUS ORAL     JARDIANCE 25 mg tablet  Generic drug: empagliflozin     montelukast 10 mg tablet  Commonly known as: SINGULAIR     pantoprazole 40 MG tablet  Commonly known as: PROTONIX     PLAVIX 75 mg tablet  Generic drug: clopidogreL     prochlorperazine 10 MG tablet  Commonly known as: COMPAZINE  Take 1 tablet (10 mg total) by mouth every 6 (six) hours as needed.     TOUJEO MAX U-300 SOLOSTAR 300 unit/mL (3 mL) insulin pen  Generic drug: insulin glargine U-300 conc     TRADJENTA 5 mg Tab tablet  Generic drug: linaGLIPtin     VITAMIN B-12 2,500 mcg Subl  Generic drug: cyanocobalamin (vitamin B-12)     vitamin D 1000 units Tab  Commonly known as: VITAMIN D3     XARELTO 20 mg Tab  Generic drug: rivaroxaban           * This list has 2 medication(s) that are the same as other medications prescribed for you. Read the directions carefully, and ask your doctor or other care provider to review them with you.                   Where to Get Your Medications        These medications were sent to [facility] [address] [address]      Phone: [phone]   cefdinir 300 MG capsule  vancomycin 125 MG capsule           PHYSICAL EXAM   VITALS: T 97.6 °F (36.4 °C)   BP (!) 173/71   P 66   RR 20   O2 95 %    Physical Exam  Vitals reviewed.   HENT:      Head: Normocephalic.   Cardiovascular:      Rate and Rhythm: Normal rate.   Pulmonary:      Effort: Pulmonary effort is normal.   Neurological:      Mental Status: She is alert.              DIAGNOSITCS   CBC:   Recent Labs   Lab 08/10/24  0728 08/11/24  0535 08/12/24  0520   WBC 9.14 7.69 7.25   HGB 10.4* 10.2* 10.6*   HCT 34.1* 33.1* 34.6*    248 253       CMP:   Recent Labs   Lab 08/06/24  1720 08/07/24  1217 08/08/24  0436 08/09/24  0419 08/10/24  0728 08/11/24  0535 08/12/24  0520   CALCIUM 8.8 8.2* 8.4   < > 8.6 8.6  "8.7   ALBUMIN 3.7 3.2* 2.6*  --   --   --   --    * 135* 136   < > 134* 132* 131*   K 2.9* 4.6 4.4   < > 5.5* 5.1 5.1   CO2 18* 16* 13*   < > 14* 15* 15*    111* 113*   < > 111* 107 107   BUN 36* 32* 29*   < > 30* 31* 33*   CREATININE 2.33* 2.14* 1.92*   < > 1.72* 1.68* 1.55*   ALKPHOS 109 91 128  --   --   --   --    ALT 19 14 17  --   --   --   --    AST 29 30 27  --   --   --   --    BILITOT 0.2 1.7* 0.8  --   --   --   --    MG  --  1.70* 2.10  --   --   --   --     < > = values in this interval not displayed.     Estimated Creatinine Clearance: 26.3 mL/min (A) (based on SCr of 1.55 mg/dL (H)).    Labs within the past 24 hours have been reviewed.     COAG:  No results for input(s): "APTT", "INR", "PTT" in the last 168 hours.    CARDIAC ENZYMES: No results for input(s): "TROPONINI", "CPK", "CKMB" in the last 72 hours.     Recent Labs   Lab 08/06/24  1720   LIPASE 203       Recent Labs     08/11/24  1949 08/11/24  1952 08/11/24  2133 08/12/24  0004 08/12/24  0715 08/12/24  1147   POCTGLUCOSE >500* >500* 449* 444* 282* 328*        Microbiology Results (last 7 days)       Procedure Component Value Units Date/Time    Stool Culture [[id number]]  (Normal) Collected: 08/07/24 1630    Order Status: Completed Specimen: Stool Updated: 08/10/24 0740     Stool Culture Negative for Salmonella, Shigella, Campylobacter, Vibrio, Aeromonas, Pleisiomonas,Yersinia, or Shiga Toxin 1 and 2.    Urine culture [[id number]]  (Abnormal)  (Susceptibility) Collected: 08/06/24 2108    Order Status: Completed Specimen: Urine Updated: 08/09/24 0701     Urine Culture >/= 100,000 colonies/ml Escherichia coli    C Diff Toxin by PCR [[id number]]  (Abnormal) Collected: 08/07/24 1630    Order Status: Completed Specimen: Stool Updated: 08/07/24 211     Clostridium difficile toxin PCR Detected    Narrative:      The Portrait Toxigenic C. difficile assay has not been   evaluated in patients that are <2 years of age.      Contact isolation " "indicated    Clostridium Diff Toxin, A & B, EIA [[id number]]  (Abnormal) Collected: 08/07/24 1630    Order Status: Completed Specimen: Stool Updated: 08/07/24 2055     Clostridium Difficile GDH Antigen Positive     Clostridium Difficile Toxin A/B Negative    C Diff Toxin by PCR [[id number]] Collected: 08/07/24 1630    Order Status: Canceled Specimen: Stool Updated: 08/07/24 1647             No results for input(s): "CHOL", "TRIG", "HDL", "LDL" in the last 72 hours.   Lab Results   Component Value Date    HGBA1C 8.5 (H) 09/28/2019        EGD    Result Date: 8/7/2024  Table formatting from the original result was not included. Procedure Date 8/7/24 Impression Overall       Patient is a 68-year-old female with a history of type 2 diabetes hypertension gastroesophageal reflux disease dementia and stage 3 kidney disease as well as coronary artery disease with coronary stent placement and pacemaker placement.  Patient is on Xarelto from previous DVT.  Patient recently had some bloody diarrhea black stools etiology unclear hemoglobin was 6.7 hematocrit 23.  Patient was received 2 units packed cells and was referred to me for endoscopic evaluation.  Patient was brought to the GI suite underwent sedation and examination of the esophagus stomach and duodenal.  24 hour bleeding scan has been ordered.  Normal duodenal in all 4 portions and into the jejunum Normal antrum fundus and cardia of the stomach Biopsy of the antrum taken for histo path and H pylori Biopsy of the GE junction taken for histo path 1 cm hiatal hernia normal esophagus cricopharyngeus muscle vocal cords Z-line at 38 cm Recommendation Follow up with me in clinic We will prep for colonoscopy on Friday after 24 hour bleeding scan is completed. Check 24 hour bleeding scan final results tomorrow Check stools for blood Follow hemoglobin hematocrit over the next 24-36 hours Supplement with thiamine B12 folate and iron. Indication None Providers Won Stinson, " CRNA Viola Richard ST Technician Damien Parisi MD Proceduralist Belkis Rosenthal RN Circulator Medications General anesthesia. I have reviewed and agree with anesthesia's plan. See anesthesia record for details. Preprocedure A history and physical has been performed, and patient medication allergies have been reviewed. The patient's tolerance of previous anesthesia has been reviewed. The risks and benefits of the procedure and the sedation options and risks were discussed with the patient. All questions were answered and informed consent obtained. ASA Score: ASA 4 - Patient with severe systemic disease that is a constant threat to life Mallampati Airway Score: II (hard and soft palate, upper portion of tonsils anduvula visible) Details of the Procedure The patient underwent monitored anesthesia care, which was administered by an anesthesia professional. The patient's blood pressure, heart rate, level of consciousness, oxygen, respirations, ECG and ETCO2 were monitored throughout the procedure. The scope was introduced through the mouth and advanced to the third part of the duodenum. Retroflexion was performed in the cardia. Prior to the procedure, the patient's H. Pylori status was unknown. The patient's estimated blood loss was minimal (<5 mL). The procedure was not difficult. The patient tolerated the procedure well. There were no apparent adverse events. Scope:  Scope Serial:  Events Procedure Events Event Event Time Procedure Events Event Event Time SCOPE IN 8/7/2024  3:23 PM SCOPE OUT 8/7/2024  3:28 PM Findings Normal duodenal in all 4 portions and into the jejunum Normal antrum fundus and cardia of the stomach Biopsy of the antrum taken for histo path and H pylori Biopsy of the GE junction taken for histo path 1 cm hiatal hernia normal esophagus cricopharyngeus muscle vocal cords Z-line at 38 cm     NM GI Bleed Study    Result Date: 8/7/2024  EXAMINATION: NM GI BLEED STUDY CLINICAL HISTORY:  anemia; TECHNIQUE: After injection of autologous red blood cells labeled in vitro with 26.1mCi of Tc-99m pertechnetate, sequential dynamic images of the abdomen were obtained for 60minutes. COMPARISON: CT abdomen pelvis 08/06/2024 FINDINGS: Abnormal accumulation of radiotracer beginning within the left upper quadrant and extending across the midline from left to right     Accumulation of radiotracer in the upper abdomen extending from left-to-right over time.  Radiotracer thought to more likely located in the gastric lumen.  Colonic hemorrhage not excluded. Electronically signed by: Mikie Ray MD Date:    08/07/2024 Time:    15:17    CT Abdomen Pelvis  Without Contrast    Result Date: 8/6/2024  EXAMINATION: CT ABDOMEN PELVIS WITHOUT CONTRAST CLINICAL HISTORY: Abdominal pain, acute, nonlocalized; TECHNIQUE: Low dose axial images, sagittal and coronal reformations were obtained from the lung bases to the pubic symphysis.  Oral contrast was not administered. COMPARISON: None FINDINGS: Liver:  No clinically significant abnormalities noted. Gallbladder/Biliary System:  No clinically significant abnormalities noted. Spleen:  No clinically significant abnormalities noted. Adrenal glands:  No clinically significant abnormalities noted. Pancreas:  No clinically significant abnormalities noted. Kidneys/Urinary Tract:  A 2.8 cm benign-appearing renal cortical cyst is noted involving the superior pole of the left kidney. Urinary bladder:  No clinically significant abnormalities noted. Prostate gland/uterus and ovaries:  No clinically significant abnormalities noted. GI tract:  No clinically significant abnormalities noted. Vascular structures:  Extensive atherosclerotic calcification is present involving the aorta and its major branches. Musculoskeletal structures:  Moderate bony demineralization is present with moderate degenerative findings noted involving the spine. Miscellaneous:  No clinically significant  abnormalities noted.     1. Fluid is present throughout the bowel suggesting the possibility of gastroenteritis. n/a CATEGORY: n/a The following dose reduction techniques are used for all CT at [facility]: 1.   Automated exposure control. 2.   Adjustment of the mA and/or kV according to patient size. 3.   Use of iterative reconstruction technique. Electronically signed by: Suleiman Fisher Date:    08/06/2024 Time:    20:38      N/A     Patient Screened for food insecurity, housing instability, transportation needs, utility difficulties, and interpersonal safety.  No needs identified    Discharge time: 33 minutes         Ciro Art MD  [facility]

## 2024-08-12 NOTE — PT/OT/SLP PROGRESS
"Physical Therapy Treatment    Patient Name:  Any Duncan   MRN:  [id number]    Recommendations:     Discharge Recommendations: Low Intensity Therapy  Discharge Equipment Recommendations: bedside commode  Barriers to discharge:  current medical status    Assessment:     Any Duncan is a 68 y.o. female admitted with a medical diagnosis of Enteritis, enteropathogenic E. coli.  She presents with the following impairments/functional limitations: weakness, impaired endurance, impaired functional mobility, gait instability, impaired balance     Patient found with HOB elevated. Patient agreeable to therapy today. Patient reports that she uses her rollator which in her room with her. Patient performed supine to sit with SBA, then sit to stand with SBA as well. Patient tolerated 100 feet with rollator and SBA/CGA. Patient returned to sitting up in chair and performed BLE seated therex. Patient requested to stay up in chair. Patient left with all needs in reach. .    Rehab Prognosis: Good; patient would benefit from acute skilled PT services to address these deficits and reach maximum level of function.    Recent Surgery: * No surgery found *      Plan:     During this hospitalization, patient to be seen 5 x/week (5-6x weekly/1-2x daily) to address the identified rehab impairments via gait training, therapeutic activities, therapeutic exercises and progress toward the following goals:    Plan of Care Expires:  09/12/24    Subjective     Chief Complaint: "I'm cold"  Patient/Family Comments/goals: to go home  Pain/Comfort:         Objective:     Communicated with nursing prior to session.  Patient found HOB elevated with peripheral IV, telemetry upon PT entry to room.     General Precautions: Standard, fall  Orthopedic Precautions: N/A  Braces: N/A  Respiratory Status: Room air     Functional Mobility:  Bed Mobility:     Supine to Sit: stand by assistance  Transfers:     Sit to Stand:  stand by assistance with " rollator  Gait: 100 feet with rollator and SBA/CGA      AM-PAC 6 CLICK MOBILITY          Treatment & Education:  See above    Patient left up in chair with all lines intact, call button in reach, chair alarm on, and nurse notified..    GOALS:   Multidisciplinary Problems       Physical Therapy Goals          Problem: Physical Therapy    Goal Priority Disciplines Outcome Goal Variances Interventions   Physical Therapy Goal     PT, PT/OT      Description: 1.  Pt will be supervised walking in room with rollator  2.  Pt will improve all tfs to Marlee  3.  Pt will be I HEP to maintain strength and endurance while in house                       Time Tracking:     PT Received On: 08/12/24  PT Start Time: 0852     PT Stop Time: 0917  PT Total Time (min): 25 min     Billable Minutes: Gait Training 15 and Therapeutic Exercise 10    Treatment Type: Treatment  PT/PTA: PT           08/12/2024

## 2024-08-12 NOTE — PLAN OF CARE
08/12/24 1306   Final Note   Assessment Type Final Discharge Note   Anticipated Discharge Disposition Home-Health   What phone number can be called within the next 1-3 days to see how you are doing after discharge? [phone]   Hospital Resources/Appts/Education Provided Provided patient/caregiver with written discharge plan information;Appointments scheduled and added to AVS   Post-Acute Status   Post-Acute Authorization Home Health  (JD )   Home Health Status Set-up Complete/Auth obtained   Coverage [employer] MCR   Discharge Delays None known at this time

## 2024-08-12 NOTE — PLAN OF CARE
Received order for patient to discharge home with home health and home PT. Patient would like [facility] and patient choice letter signed. Oanh at [facility] notified of referral and clinicals faxed.

## 2024-08-13 ENCOUNTER — PATIENT OUTREACH (OUTPATIENT)
Dept: ADMINISTRATIVE | Facility: CLINIC | Age: 69
End: 2024-08-13
Payer: MEDICARE

## 2024-08-13 NOTE — PROGRESS NOTES
C3 nurse attempted to contact [facility]  for a TCC post hospital discharge follow up call. No answer. Left voicemail with callback information. The patient has a scheduled HOSFU appointment with Roger Duenas III, MD (Family Medicine); 8/19/24 at 9:30 am

## 2024-08-14 LAB
BEAKER SEE SCANNED REPORT: NORMAL
O+P STL MICRO: NORMAL

## 2024-08-14 NOTE — PROGRESS NOTES
C3 nurse made second attempt to contact [facility] for a TCC post hospital discharge follow up call.

## 2024-08-15 NOTE — PROGRESS NOTES
C3 nurse made third attempt to contact [facility] for a TCC post hospital discharge follow up call.

## 2024-08-24 ENCOUNTER — LAB REQUISITION (OUTPATIENT)
Dept: LAB | Facility: HOSPITAL | Age: 69
End: 2024-08-24
Payer: MEDICARE

## 2024-08-24 DIAGNOSIS — D64.9 ANEMIA, UNSPECIFIED: ICD-10-CM

## 2024-08-24 DIAGNOSIS — I10 ESSENTIAL (PRIMARY) HYPERTENSION: ICD-10-CM

## 2024-08-24 DIAGNOSIS — A04.72 ENTEROCOLITIS DUE TO CLOSTRIDIUM DIFFICILE, NOT SPECIFIED AS RECURRENT: ICD-10-CM

## 2024-08-24 LAB
C DIFF TOX A+B STL QL IA: POSITIVE
CLOSTRIDIUM DIFFICILE GDH ANTIGEN (OHS): POSITIVE

## 2024-08-24 PROCEDURE — 86318 IA INFECTIOUS AGENT ANTIBODY: CPT | Performed by: FAMILY MEDICINE

## 2024-08-26 ENCOUNTER — LAB REQUISITION (OUTPATIENT)
Dept: LAB | Facility: HOSPITAL | Age: 69
End: 2024-08-26
Payer: MEDICARE

## 2024-08-26 DIAGNOSIS — N18.31 CHRONIC KIDNEY DISEASE, STAGE 3A: ICD-10-CM

## 2024-08-26 DIAGNOSIS — A04.0 ENTEROPATHOGENIC ESCHERICHIA COLI INFECTION: ICD-10-CM

## 2024-08-26 DIAGNOSIS — A04.72 ENTEROCOLITIS DUE TO CLOSTRIDIUM DIFFICILE, NOT SPECIFIED AS RECURRENT: ICD-10-CM

## 2024-08-26 DIAGNOSIS — I12.9 HYPERTENSIVE CHRONIC KIDNEY DISEASE WITH STAGE 1 THROUGH STAGE 4 CHRONIC KIDNEY DISEASE, OR UNSPECIFIED CHRONIC KIDNEY DISEASE: ICD-10-CM

## 2024-08-26 LAB
ALBUMIN SERPL-MCNC: 3.7 G/DL (ref 3.4–5)
ALBUMIN/GLOB SERPL: 1.2 RATIO
ALP SERPL-CCNC: 123 UNIT/L (ref 50–144)
ALT SERPL-CCNC: 14 UNIT/L (ref 1–45)
ANION GAP SERPL CALC-SCNC: 8 MEQ/L (ref 2–13)
AST SERPL-CCNC: 22 UNIT/L (ref 14–36)
BASOPHILS # BLD AUTO: 0.03 X10(3)/MCL (ref 0.01–0.08)
BASOPHILS NFR BLD AUTO: 0.4 % (ref 0.1–1.2)
BILIRUB SERPL-MCNC: 0.3 MG/DL (ref 0–1)
BUN SERPL-MCNC: 35 MG/DL (ref 7–20)
CALCIUM SERPL-MCNC: 9.1 MG/DL (ref 8.4–10.2)
CHLORIDE SERPL-SCNC: 101 MMOL/L (ref 98–110)
CO2 SERPL-SCNC: 26 MMOL/L (ref 21–32)
CREAT SERPL-MCNC: 1.8 MG/DL (ref 0.66–1.25)
CREAT/UREA NIT SERPL: 19 (ref 12–20)
EOSINOPHIL # BLD AUTO: 0.08 X10(3)/MCL (ref 0.04–0.36)
EOSINOPHIL NFR BLD AUTO: 1.1 % (ref 0.7–7)
ERYTHROCYTE [DISTWIDTH] IN BLOOD BY AUTOMATED COUNT: 18.4 % (ref 11–14.5)
GFR SERPLBLD CREATININE-BSD FMLA CKD-EPI: 30 ML/MIN/1.73/M2
GLOBULIN SER-MCNC: 3.1 GM/DL (ref 2–3.9)
GLUCOSE SERPL-MCNC: 345 MG/DL (ref 70–115)
HCT VFR BLD AUTO: 29 % (ref 36–48)
HGB BLD-MCNC: 8.9 G/DL (ref 11.8–16)
IMM GRANULOCYTES # BLD AUTO: 0.04 X10(3)/MCL (ref 0–0.03)
IMM GRANULOCYTES NFR BLD AUTO: 0.6 % (ref 0–0.5)
LYMPHOCYTES # BLD AUTO: 1.08 X10(3)/MCL (ref 1.16–3.74)
LYMPHOCYTES NFR BLD AUTO: 15.3 % (ref 20–55)
MCH RBC QN AUTO: 25.4 PG (ref 27–34)
MCHC RBC AUTO-ENTMCNC: 30.7 G/DL (ref 31–37)
MCV RBC AUTO: 82.9 FL (ref 79–99)
MONOCYTES # BLD AUTO: 0.47 X10(3)/MCL (ref 0.24–0.36)
MONOCYTES NFR BLD AUTO: 6.7 % (ref 4.7–12.5)
NEUTROPHILS # BLD AUTO: 5.35 X10(3)/MCL (ref 1.56–6.13)
NEUTROPHILS NFR BLD AUTO: 75.9 % (ref 37–73)
NRBC BLD AUTO-RTO: 0 %
PLATELET # BLD AUTO: 287 X10(3)/MCL (ref 140–371)
PMV BLD AUTO: 10.8 FL (ref 9.4–12.4)
POTASSIUM SERPL-SCNC: 4.9 MMOL/L (ref 3.5–5.1)
PROT SERPL-MCNC: 6.8 GM/DL (ref 6.3–8.2)
RBC # BLD AUTO: 3.5 X10(6)/MCL (ref 4–5.1)
SODIUM SERPL-SCNC: 135 MMOL/L (ref 136–145)
WBC # BLD AUTO: 7.05 X10(3)/MCL (ref 4–11.5)

## 2024-08-26 PROCEDURE — 85025 COMPLETE CBC W/AUTO DIFF WBC: CPT | Performed by: FAMILY MEDICINE

## 2024-08-26 PROCEDURE — 80053 COMPREHEN METABOLIC PANEL: CPT | Performed by: FAMILY MEDICINE

## 2024-08-29 ENCOUNTER — LAB REQUISITION (OUTPATIENT)
Dept: LAB | Facility: HOSPITAL | Age: 69
End: 2024-08-29
Payer: MEDICARE

## 2024-08-29 DIAGNOSIS — D62 ACUTE POSTHEMORRHAGIC ANEMIA: ICD-10-CM

## 2024-08-29 DIAGNOSIS — N18.31 CHRONIC KIDNEY DISEASE, STAGE 3A: ICD-10-CM

## 2024-08-29 DIAGNOSIS — A04.72 ENTEROCOLITIS DUE TO CLOSTRIDIUM DIFFICILE, NOT SPECIFIED AS RECURRENT: ICD-10-CM

## 2024-08-29 LAB
BASOPHILS # BLD AUTO: 0.03 X10(3)/MCL (ref 0.01–0.08)
BASOPHILS NFR BLD AUTO: 0.5 % (ref 0.1–1.2)
EOSINOPHIL # BLD AUTO: 0.07 X10(3)/MCL (ref 0.04–0.36)
EOSINOPHIL NFR BLD AUTO: 1.1 % (ref 0.7–7)
ERYTHROCYTE [DISTWIDTH] IN BLOOD BY AUTOMATED COUNT: 18.3 % (ref 11–14.5)
HCT VFR BLD AUTO: 30.6 % (ref 36–48)
HGB BLD-MCNC: 9 G/DL (ref 11.8–16)
IMM GRANULOCYTES # BLD AUTO: 0.05 X10(3)/MCL (ref 0–0.03)
IMM GRANULOCYTES NFR BLD AUTO: 0.8 % (ref 0–0.5)
LYMPHOCYTES # BLD AUTO: 1.38 X10(3)/MCL (ref 1.16–3.74)
LYMPHOCYTES NFR BLD AUTO: 20.9 % (ref 20–55)
MCH RBC QN AUTO: 24.6 PG (ref 27–34)
MCHC RBC AUTO-ENTMCNC: 29.4 G/DL (ref 31–37)
MCV RBC AUTO: 83.6 FL (ref 79–99)
MONOCYTES # BLD AUTO: 0.66 X10(3)/MCL (ref 0.24–0.36)
MONOCYTES NFR BLD AUTO: 10 % (ref 4.7–12.5)
NEUTROPHILS # BLD AUTO: 4.4 X10(3)/MCL (ref 1.56–6.13)
NEUTROPHILS NFR BLD AUTO: 66.7 % (ref 37–73)
PLATELET # BLD AUTO: 287 X10(3)/MCL (ref 140–371)
PMV BLD AUTO: 10.7 FL (ref 9.4–12.4)
RBC # BLD AUTO: 3.66 X10(6)/MCL (ref 4–5.1)
WBC # BLD AUTO: 6.59 X10(3)/MCL (ref 4–11.5)

## 2024-08-29 PROCEDURE — 85025 COMPLETE CBC W/AUTO DIFF WBC: CPT | Performed by: FAMILY MEDICINE

## 2024-09-05 ENCOUNTER — LAB REQUISITION (OUTPATIENT)
Dept: LAB | Facility: HOSPITAL | Age: 69
End: 2024-09-05
Payer: MEDICARE

## 2024-09-05 DIAGNOSIS — N39.0 URINARY TRACT INFECTION, SITE NOT SPECIFIED: ICD-10-CM

## 2024-09-05 LAB
BACTERIA #/AREA URNS AUTO: ABNORMAL /HPF
BILIRUB UR QL STRIP.AUTO: NEGATIVE
CLARITY UR: ABNORMAL
COLOR UR AUTO: ABNORMAL
GLUCOSE UR QL STRIP: 500
HGB UR QL STRIP: ABNORMAL
KETONES UR QL STRIP: NEGATIVE
LEUKOCYTE ESTERASE UR QL STRIP: NEGATIVE
NITRITE UR QL STRIP: NEGATIVE
PH UR STRIP: 7 [PH]
PROT UR QL STRIP: 100
RBC #/AREA URNS AUTO: >100 /HPF
SP GR UR STRIP.AUTO: 1.02 (ref 1–1.03)
SQUAMOUS #/AREA URNS AUTO: ABNORMAL /HPF
UROBILINOGEN UR STRIP-ACNC: 0.2
WBC #/AREA URNS AUTO: ABNORMAL /HPF

## 2024-09-05 PROCEDURE — 81003 URINALYSIS AUTO W/O SCOPE: CPT | Performed by: FAMILY MEDICINE

## 2024-09-05 PROCEDURE — 87077 CULTURE AEROBIC IDENTIFY: CPT | Performed by: FAMILY MEDICINE

## 2024-09-05 PROCEDURE — 81015 MICROSCOPIC EXAM OF URINE: CPT | Performed by: FAMILY MEDICINE

## 2024-09-06 ENCOUNTER — LAB REQUISITION (OUTPATIENT)
Dept: LAB | Facility: HOSPITAL | Age: 69
End: 2024-09-06
Payer: MEDICARE

## 2024-09-06 DIAGNOSIS — D62 ACUTE POSTHEMORRHAGIC ANEMIA: ICD-10-CM

## 2024-09-06 DIAGNOSIS — N18.31 CHRONIC KIDNEY DISEASE, STAGE 3A: ICD-10-CM

## 2024-09-06 DIAGNOSIS — I12.9 HYPERTENSIVE CHRONIC KIDNEY DISEASE WITH STAGE 1 THROUGH STAGE 4 CHRONIC KIDNEY DISEASE, OR UNSPECIFIED CHRONIC KIDNEY DISEASE: ICD-10-CM

## 2024-09-06 LAB
BASOPHILS # BLD AUTO: 0.03 X10(3)/MCL (ref 0.01–0.08)
BASOPHILS NFR BLD AUTO: 0.4 % (ref 0.1–1.2)
EOSINOPHIL # BLD AUTO: 0.07 X10(3)/MCL (ref 0.04–0.36)
EOSINOPHIL NFR BLD AUTO: 0.9 % (ref 0.7–7)
ERYTHROCYTE [DISTWIDTH] IN BLOOD BY AUTOMATED COUNT: 18.8 % (ref 11–14.5)
HCT VFR BLD AUTO: 26.7 % (ref 36–48)
HGB BLD-MCNC: 8.1 G/DL (ref 11.8–16)
IMM GRANULOCYTES # BLD AUTO: 0.04 X10(3)/MCL (ref 0–0.03)
IMM GRANULOCYTES NFR BLD AUTO: 0.5 % (ref 0–0.5)
LYMPHOCYTES # BLD AUTO: 1.22 X10(3)/MCL (ref 1.16–3.74)
LYMPHOCYTES NFR BLD AUTO: 16.2 % (ref 20–55)
MCH RBC QN AUTO: 25.8 PG (ref 27–34)
MCHC RBC AUTO-ENTMCNC: 30.3 G/DL (ref 31–37)
MCV RBC AUTO: 85 FL (ref 79–99)
MONOCYTES # BLD AUTO: 0.49 X10(3)/MCL (ref 0.24–0.36)
MONOCYTES NFR BLD AUTO: 6.5 % (ref 4.7–12.5)
NEUTROPHILS # BLD AUTO: 5.68 X10(3)/MCL (ref 1.56–6.13)
NEUTROPHILS NFR BLD AUTO: 75.5 % (ref 37–73)
NRBC BLD AUTO-RTO: 0 %
PLATELET # BLD AUTO: 283 X10(3)/MCL (ref 140–371)
PMV BLD AUTO: 10.7 FL (ref 9.4–12.4)
RBC # BLD AUTO: 3.14 X10(6)/MCL (ref 4–5.1)
WBC # BLD AUTO: 7.53 X10(3)/MCL (ref 4–11.5)

## 2024-09-06 PROCEDURE — 85025 COMPLETE CBC W/AUTO DIFF WBC: CPT | Performed by: FAMILY MEDICINE

## 2024-09-07 LAB — BACTERIA UR CULT: ABNORMAL

## 2024-09-13 ENCOUNTER — LAB REQUISITION (OUTPATIENT)
Dept: LAB | Facility: HOSPITAL | Age: 69
End: 2024-09-13
Payer: MEDICARE

## 2024-09-13 DIAGNOSIS — I12.9 HYPERTENSIVE CHRONIC KIDNEY DISEASE WITH STAGE 1 THROUGH STAGE 4 CHRONIC KIDNEY DISEASE, OR UNSPECIFIED CHRONIC KIDNEY DISEASE: ICD-10-CM

## 2024-09-13 DIAGNOSIS — D62 ACUTE POSTHEMORRHAGIC ANEMIA: ICD-10-CM

## 2024-09-13 DIAGNOSIS — N18.31 CHRONIC KIDNEY DISEASE, STAGE 3A: ICD-10-CM

## 2024-09-13 LAB
BASOPHILS # BLD AUTO: 0.04 X10(3)/MCL (ref 0.01–0.08)
BASOPHILS NFR BLD AUTO: 0.6 % (ref 0.1–1.2)
EOSINOPHIL # BLD AUTO: 0.15 X10(3)/MCL (ref 0.04–0.36)
EOSINOPHIL NFR BLD AUTO: 2.1 % (ref 0.7–7)
ERYTHROCYTE [DISTWIDTH] IN BLOOD BY AUTOMATED COUNT: 18.6 % (ref 11–14.5)
HCT VFR BLD AUTO: 32.8 % (ref 36–48)
HGB BLD-MCNC: 9.7 G/DL (ref 11.8–16)
IMM GRANULOCYTES # BLD AUTO: 0.03 X10(3)/MCL (ref 0–0.03)
IMM GRANULOCYTES NFR BLD AUTO: 0.4 % (ref 0–0.5)
LYMPHOCYTES # BLD AUTO: 1.26 X10(3)/MCL (ref 1.16–3.74)
LYMPHOCYTES NFR BLD AUTO: 17.6 % (ref 20–55)
MCH RBC QN AUTO: 25.3 PG (ref 27–34)
MCHC RBC AUTO-ENTMCNC: 29.6 G/DL (ref 31–37)
MCV RBC AUTO: 85.6 FL (ref 79–99)
MONOCYTES # BLD AUTO: 0.54 X10(3)/MCL (ref 0.24–0.36)
MONOCYTES NFR BLD AUTO: 7.6 % (ref 4.7–12.5)
NEUTROPHILS # BLD AUTO: 5.12 X10(3)/MCL (ref 1.56–6.13)
NEUTROPHILS NFR BLD AUTO: 71.7 % (ref 37–73)
NRBC BLD AUTO-RTO: 0 %
PLATELET # BLD AUTO: 316 X10(3)/MCL (ref 140–371)
PMV BLD AUTO: 10.1 FL (ref 9.4–12.4)
RBC # BLD AUTO: 3.83 X10(6)/MCL (ref 4–5.1)
WBC # BLD AUTO: 7.14 X10(3)/MCL (ref 4–11.5)

## 2024-09-13 PROCEDURE — 85025 COMPLETE CBC W/AUTO DIFF WBC: CPT | Performed by: FAMILY MEDICINE

## 2024-10-07 ENCOUNTER — HOSPITAL ENCOUNTER (OUTPATIENT)
Dept: RADIOLOGY | Facility: HOSPITAL | Age: 69
Discharge: HOME | End: 2024-10-07
Attending: FAMILY MEDICINE
Payer: MEDICARE

## 2024-10-07 DIAGNOSIS — R31.9 HEMATURIA SYNDROME: ICD-10-CM

## 2024-10-07 PROCEDURE — 76770 US EXAM ABDO BACK WALL COMP: CPT | Mod: TC

## 2024-10-25 ENCOUNTER — LAB VISIT (OUTPATIENT)
Dept: LAB | Facility: HOSPITAL | Age: 69
End: 2024-10-25
Attending: INTERNAL MEDICINE
Payer: MEDICARE

## 2024-10-25 DIAGNOSIS — E61.1 IRON DEFICIENCY: ICD-10-CM

## 2024-10-25 DIAGNOSIS — N18.31 CHRONIC KIDNEY DISEASE (CKD) STAGE G3A/A1, MODERATELY DECREASED GLOMERULAR FILTRATION RATE (GFR) BETWEEN 45-59 ML/MIN/1.73 SQUARE METER AND ALBUMINURIA CREATININE RATIO LESS THAN 30 MG/G: ICD-10-CM

## 2024-10-25 DIAGNOSIS — D63.8 MEGALOBLASTIC ANEMIA DUE TO FISH TAPEWORM: Primary | ICD-10-CM

## 2024-10-25 DIAGNOSIS — I10 HYPERTENSION, UNSPECIFIED TYPE: ICD-10-CM

## 2024-10-25 DIAGNOSIS — E11.9 DIABETES MELLITUS WITHOUT COMPLICATION: ICD-10-CM

## 2024-10-25 DIAGNOSIS — B70.0 MEGALOBLASTIC ANEMIA DUE TO FISH TAPEWORM: Primary | ICD-10-CM

## 2024-10-25 DIAGNOSIS — E55.9 AVITAMINOSIS D: ICD-10-CM

## 2024-10-25 LAB
ALBUMIN SERPL-MCNC: 4.3 G/DL (ref 3.4–5)
BACTERIA #/AREA URNS AUTO: ABNORMAL /HPF
BASOPHILS # BLD AUTO: 0.03 X10(3)/MCL (ref 0.01–0.08)
BASOPHILS NFR BLD AUTO: 0.5 % (ref 0.1–1.2)
BILIRUB UR QL STRIP.AUTO: NEGATIVE
BUN SERPL-MCNC: 26 MG/DL (ref 7–20)
CALCIUM SERPL-MCNC: 9.7 MG/DL (ref 8.4–10.2)
CALCIUM SERPL-MCNC: 9.7 MG/DL (ref 8.4–10.2)
CHLORIDE SERPL-SCNC: 103 MMOL/L (ref 98–110)
CLARITY UR: ABNORMAL
CO2 SERPL-SCNC: 24 MMOL/L (ref 21–32)
COLOR UR AUTO: YELLOW
CREAT SERPL-MCNC: 1.55 MG/DL (ref 0.66–1.25)
CREAT UR-MCNC: 34 MG/DL (ref 45–106)
CREAT UR-MCNC: 34.6 MG/DL
CREAT/UREA NIT SERPL: 17 (ref 12–20)
EOSINOPHIL # BLD AUTO: 0.06 X10(3)/MCL (ref 0.04–0.36)
EOSINOPHIL NFR BLD AUTO: 1 % (ref 0.7–7)
ERYTHROCYTE [DISTWIDTH] IN BLOOD BY AUTOMATED COUNT: 15.4 % (ref 11–14.5)
GFR SERPLBLD CREATININE-BSD FMLA CKD-EPI: 36 ML/MIN/1.73/M2
GLUCOSE SERPL-MCNC: 273 MG/DL (ref 70–115)
GLUCOSE UR QL STRIP: >=1000
HCT VFR BLD AUTO: 30.9 % (ref 36–48)
HGB BLD-MCNC: 8.8 G/DL (ref 11.8–16)
HGB UR QL STRIP: ABNORMAL
HYPOCHROMIA BLD QL SMEAR: ABNORMAL
IMM GRANULOCYTES # BLD AUTO: 0.02 X10(3)/MCL (ref 0–0.03)
IMM GRANULOCYTES NFR BLD AUTO: 0.3 % (ref 0–0.5)
KETONES UR QL STRIP: NEGATIVE
LEUKOCYTE ESTERASE UR QL STRIP: ABNORMAL
LYMPHOCYTES # BLD AUTO: 0.85 X10(3)/MCL (ref 1.16–3.74)
LYMPHOCYTES NFR BLD AUTO: 14.2 % (ref 20–55)
MCH RBC QN AUTO: 24.3 PG (ref 27–34)
MCHC RBC AUTO-ENTMCNC: 28.5 G/DL (ref 31–37)
MCV RBC AUTO: 85.4 FL (ref 79–99)
MICROALBUMIN UR-MCNC: 148 UG/ML
MICROALBUMIN/CREAT RATIO PNL UR: 435.3 MG/GM CR (ref 0–30)
MONOCYTES # BLD AUTO: 0.51 X10(3)/MCL (ref 0.24–0.36)
MONOCYTES NFR BLD AUTO: 8.5 % (ref 4.7–12.5)
NEUTROPHILS # BLD AUTO: 4.52 X10(3)/MCL (ref 1.56–6.13)
NEUTROPHILS NFR BLD AUTO: 75.5 % (ref 37–73)
NITRITE UR QL STRIP: NEGATIVE
NRBC BLD AUTO-RTO: 0 %
PH UR STRIP: 6 [PH]
PHOSPHATE SERPL-MCNC: 4.5 MG/DL (ref 2.5–4.9)
PLATELET # BLD AUTO: 255 X10(3)/MCL (ref 140–371)
PLATELET # BLD EST: ADEQUATE 10*3/UL
PMV BLD AUTO: 10.6 FL (ref 9.4–12.4)
POLYCHROMASIA BLD QL SMEAR: SLIGHT
POTASSIUM SERPL-SCNC: 4.6 MMOL/L (ref 3.5–5.1)
PROT UR QL STRIP: ABNORMAL
PROT UR STRIP-MCNC: 43 MG/DL
PTH-INTACT SERPL-MCNC: 68 PG/ML (ref 14–73)
RBC # BLD AUTO: 3.62 X10(6)/MCL (ref 4–5.1)
RBC #/AREA URNS AUTO: ABNORMAL /HPF
RBC MORPH BLD: ABNORMAL
SODIUM SERPL-SCNC: 139 MMOL/L (ref 136–145)
SP GR UR STRIP.AUTO: 1.01 (ref 1–1.03)
SQUAMOUS #/AREA URNS AUTO: ABNORMAL /HPF
UROBILINOGEN UR STRIP-ACNC: 0.2
WBC # BLD AUTO: 5.99 X10(3)/MCL (ref 4–11.5)
WBC #/AREA URNS AUTO: ABNORMAL /HPF

## 2024-10-25 PROCEDURE — 85025 COMPLETE CBC W/AUTO DIFF WBC: CPT

## 2024-10-25 PROCEDURE — 81015 MICROSCOPIC EXAM OF URINE: CPT

## 2024-10-25 PROCEDURE — 84156 ASSAY OF PROTEIN URINE: CPT

## 2024-10-25 PROCEDURE — 87086 URINE CULTURE/COLONY COUNT: CPT

## 2024-10-25 PROCEDURE — 36415 COLL VENOUS BLD VENIPUNCTURE: CPT

## 2024-10-25 PROCEDURE — 82570 ASSAY OF URINE CREATININE: CPT

## 2024-10-25 PROCEDURE — 83970 ASSAY OF PARATHORMONE: CPT

## 2024-10-25 PROCEDURE — 81003 URINALYSIS AUTO W/O SCOPE: CPT

## 2024-10-25 PROCEDURE — 80069 RENAL FUNCTION PANEL: CPT

## 2024-10-25 PROCEDURE — 82043 UR ALBUMIN QUANTITATIVE: CPT

## 2024-10-28 LAB — BACTERIA UR CULT: NORMAL

## 2024-12-13 ENCOUNTER — LAB REQUISITION (OUTPATIENT)
Dept: LAB | Facility: HOSPITAL | Age: 69
End: 2024-12-13
Payer: MEDICARE

## 2024-12-13 DIAGNOSIS — N18.31 CHRONIC KIDNEY DISEASE, STAGE 3A: ICD-10-CM

## 2024-12-13 DIAGNOSIS — D62 ACUTE POSTHEMORRHAGIC ANEMIA: ICD-10-CM

## 2024-12-13 DIAGNOSIS — I25.10 ATHEROSCLEROTIC HEART DISEASE OF NATIVE CORONARY ARTERY WITHOUT ANGINA PECTORIS: ICD-10-CM

## 2024-12-13 DIAGNOSIS — I12.9 HYPERTENSIVE CHRONIC KIDNEY DISEASE WITH STAGE 1 THROUGH STAGE 4 CHRONIC KIDNEY DISEASE, OR UNSPECIFIED CHRONIC KIDNEY DISEASE: ICD-10-CM

## 2024-12-13 LAB
ALBUMIN SERPL-MCNC: 4 G/DL (ref 3.4–5)
ALBUMIN/GLOB SERPL: 1.7 RATIO
ALP SERPL-CCNC: 248 UNIT/L (ref 50–144)
ALT SERPL-CCNC: 401 UNIT/L (ref 1–45)
ANION GAP SERPL CALC-SCNC: 12 MEQ/L (ref 2–13)
AST SERPL-CCNC: 237 UNIT/L (ref 14–36)
BASOPHILS # BLD AUTO: 0.05 X10(3)/MCL (ref 0.01–0.08)
BASOPHILS NFR BLD AUTO: 0.6 % (ref 0.1–1.2)
BILIRUB SERPL-MCNC: 0.5 MG/DL (ref 0–1)
BUN SERPL-MCNC: 53 MG/DL (ref 7–20)
CALCIUM SERPL-MCNC: 8.8 MG/DL (ref 8.4–10.2)
CHLORIDE SERPL-SCNC: 104 MMOL/L (ref 98–110)
CO2 SERPL-SCNC: 19 MMOL/L (ref 21–32)
CREAT SERPL-MCNC: 2.2 MG/DL (ref 0.66–1.25)
CREAT/UREA NIT SERPL: 24 (ref 12–20)
EOSINOPHIL # BLD AUTO: 0.08 X10(3)/MCL (ref 0.04–0.36)
EOSINOPHIL NFR BLD AUTO: 0.9 % (ref 0.7–7)
ERYTHROCYTE [DISTWIDTH] IN BLOOD BY AUTOMATED COUNT: 17.7 % (ref 11–14.5)
GFR SERPLBLD CREATININE-BSD FMLA CKD-EPI: 23 ML/MIN/1.73/M2
GLOBULIN SER-MCNC: 2.3 GM/DL (ref 2–3.9)
GLUCOSE SERPL-MCNC: 129 MG/DL (ref 70–115)
HCT VFR BLD AUTO: 26.9 % (ref 36–48)
HGB BLD-MCNC: 7.9 G/DL (ref 11.8–16)
IMM GRANULOCYTES # BLD AUTO: 0.05 X10(3)/MCL (ref 0–0.03)
IMM GRANULOCYTES NFR BLD AUTO: 0.6 % (ref 0–0.5)
LYMPHOCYTES # BLD AUTO: 1.34 X10(3)/MCL (ref 1.16–3.74)
LYMPHOCYTES NFR BLD AUTO: 15.7 % (ref 20–55)
MCH RBC QN AUTO: 22.9 PG (ref 27–34)
MCHC RBC AUTO-ENTMCNC: 29.4 G/DL (ref 31–37)
MCV RBC AUTO: 78 FL (ref 79–99)
MONOCYTES # BLD AUTO: 0.97 X10(3)/MCL (ref 0.24–0.36)
MONOCYTES NFR BLD AUTO: 11.4 % (ref 4.7–12.5)
NEUTROPHILS # BLD AUTO: 6.04 X10(3)/MCL (ref 1.56–6.13)
NEUTROPHILS NFR BLD AUTO: 70.8 % (ref 37–73)
NRBC BLD AUTO-RTO: 1.2 %
PLATELET # BLD AUTO: 284 X10(3)/MCL (ref 140–371)
PMV BLD AUTO: 10.7 FL (ref 9.4–12.4)
POTASSIUM SERPL-SCNC: 3.9 MMOL/L (ref 3.5–5.1)
PROT SERPL-MCNC: 6.3 GM/DL (ref 6.3–8.2)
RBC # BLD AUTO: 3.45 X10(6)/MCL (ref 4–5.1)
SODIUM SERPL-SCNC: 135 MMOL/L (ref 136–145)
WBC # BLD AUTO: 8.53 X10(3)/MCL (ref 4–11.5)

## 2024-12-13 PROCEDURE — 80053 COMPREHEN METABOLIC PANEL: CPT | Performed by: FAMILY MEDICINE

## 2024-12-13 PROCEDURE — 85025 COMPLETE CBC W/AUTO DIFF WBC: CPT | Performed by: FAMILY MEDICINE

## 2024-12-18 ENCOUNTER — LAB REQUISITION (OUTPATIENT)
Dept: LAB | Facility: HOSPITAL | Age: 69
End: 2024-12-18
Payer: MEDICARE

## 2024-12-18 DIAGNOSIS — N18.31 CHRONIC KIDNEY DISEASE, STAGE 3A: ICD-10-CM

## 2024-12-18 DIAGNOSIS — D62 ACUTE POSTHEMORRHAGIC ANEMIA: ICD-10-CM

## 2024-12-18 DIAGNOSIS — I12.9 HYPERTENSIVE CHRONIC KIDNEY DISEASE WITH STAGE 1 THROUGH STAGE 4 CHRONIC KIDNEY DISEASE, OR UNSPECIFIED CHRONIC KIDNEY DISEASE: ICD-10-CM

## 2024-12-18 LAB
ALBUMIN SERPL-MCNC: 3.5 G/DL (ref 3.4–5)
ALBUMIN/GLOB SERPL: 1.4 RATIO
ALP SERPL-CCNC: 351 UNIT/L (ref 50–144)
ALT SERPL-CCNC: 146 UNIT/L (ref 1–45)
ANION GAP SERPL CALC-SCNC: 15 MEQ/L (ref 2–13)
AST SERPL-CCNC: 64 UNIT/L (ref 14–36)
BASOPHILS # BLD AUTO: 0.03 X10(3)/MCL (ref 0.01–0.08)
BASOPHILS NFR BLD AUTO: 0.4 % (ref 0.1–1.2)
BILIRUB SERPL-MCNC: 0.5 MG/DL (ref 0–1)
BUN SERPL-MCNC: 49 MG/DL (ref 7–20)
CALCIUM SERPL-MCNC: 8.4 MG/DL (ref 8.4–10.2)
CHLORIDE SERPL-SCNC: 108 MMOL/L (ref 98–110)
CO2 SERPL-SCNC: 14 MMOL/L (ref 21–32)
CREAT SERPL-MCNC: 2.42 MG/DL (ref 0.66–1.25)
CREAT/UREA NIT SERPL: 20 (ref 12–20)
EOSINOPHIL # BLD AUTO: 0.1 X10(3)/MCL (ref 0.04–0.36)
EOSINOPHIL NFR BLD AUTO: 1.3 % (ref 0.7–7)
ERYTHROCYTE [DISTWIDTH] IN BLOOD BY AUTOMATED COUNT: 18.9 % (ref 11–14.5)
GFR SERPLBLD CREATININE-BSD FMLA CKD-EPI: 21 ML/MIN/1.73/M2
GLOBULIN SER-MCNC: 2.5 GM/DL (ref 2–3.9)
GLUCOSE SERPL-MCNC: 236 MG/DL (ref 70–115)
HCT VFR BLD AUTO: 26.9 % (ref 36–48)
HGB BLD-MCNC: 8 G/DL (ref 11.8–16)
IMM GRANULOCYTES # BLD AUTO: 0.07 X10(3)/MCL (ref 0–0.03)
IMM GRANULOCYTES NFR BLD AUTO: 0.9 % (ref 0–0.5)
LYMPHOCYTES # BLD AUTO: 1.25 X10(3)/MCL (ref 1.16–3.74)
LYMPHOCYTES NFR BLD AUTO: 16.8 % (ref 20–55)
MCH RBC QN AUTO: 22.6 PG (ref 27–34)
MCHC RBC AUTO-ENTMCNC: 29.7 G/DL (ref 31–37)
MCV RBC AUTO: 76 FL (ref 79–99)
MONOCYTES # BLD AUTO: 0.67 X10(3)/MCL (ref 0.24–0.36)
MONOCYTES NFR BLD AUTO: 9 % (ref 4.7–12.5)
NEUTROPHILS # BLD AUTO: 5.32 X10(3)/MCL (ref 1.56–6.13)
NEUTROPHILS NFR BLD AUTO: 71.6 % (ref 37–73)
NRBC BLD AUTO-RTO: 1.2 %
PLATELET # BLD AUTO: 194 X10(3)/MCL (ref 140–371)
PMV BLD AUTO: 10.6 FL (ref 9.4–12.4)
POTASSIUM SERPL-SCNC: 5.2 MMOL/L (ref 3.5–5.1)
PROT SERPL-MCNC: 6 GM/DL (ref 6.3–8.2)
RBC # BLD AUTO: 3.54 X10(6)/MCL (ref 4–5.1)
SODIUM SERPL-SCNC: 137 MMOL/L (ref 136–145)
WBC # BLD AUTO: 7.44 X10(3)/MCL (ref 4–11.5)

## 2024-12-18 PROCEDURE — 80053 COMPREHEN METABOLIC PANEL: CPT | Performed by: FAMILY MEDICINE

## 2024-12-18 PROCEDURE — 85025 COMPLETE CBC W/AUTO DIFF WBC: CPT | Performed by: FAMILY MEDICINE

## 2024-12-25 ENCOUNTER — HOSPITAL ENCOUNTER (EMERGENCY)
Facility: HOSPITAL | Age: 69
Discharge: HOME | End: 2024-12-25
Payer: MEDICARE

## 2024-12-25 VITALS
RESPIRATION RATE: 18 BRPM | HEIGHT: 62 IN | SYSTOLIC BLOOD PRESSURE: 131 MMHG | TEMPERATURE: 97 F | DIASTOLIC BLOOD PRESSURE: 60 MMHG | BODY MASS INDEX: 21.16 KG/M2 | HEART RATE: 66 BPM | WEIGHT: 115 LBS | OXYGEN SATURATION: 100 %

## 2024-12-25 DIAGNOSIS — R11.2 NAUSEA AND VOMITING, UNSPECIFIED VOMITING TYPE: ICD-10-CM

## 2024-12-25 DIAGNOSIS — S60.211A CONTUSION OF RIGHT WRIST, INITIAL ENCOUNTER: Primary | ICD-10-CM

## 2024-12-25 PROCEDURE — 99284 EMERGENCY DEPT VISIT MOD MDM: CPT

## 2024-12-25 PROCEDURE — 25000003 PHARM REV CODE 250

## 2024-12-25 RX ORDER — DICYCLOMINE HYDROCHLORIDE 20 MG/1
20 TABLET ORAL
Status: COMPLETED | OUTPATIENT
Start: 2024-12-25 | End: 2024-12-25

## 2024-12-25 RX ORDER — ONDANSETRON 4 MG/1
4 TABLET, ORALLY DISINTEGRATING ORAL EVERY 8 HOURS PRN
Qty: 20 TABLET | Refills: 0 | Status: SHIPPED | OUTPATIENT
Start: 2024-12-25

## 2024-12-25 RX ORDER — DICYCLOMINE HYDROCHLORIDE 20 MG/1
20 TABLET ORAL EVERY 6 HOURS PRN
Qty: 20 TABLET | Refills: 0 | Status: SHIPPED | OUTPATIENT
Start: 2024-12-25

## 2024-12-25 RX ADMIN — DICYCLOMINE HYDROCHLORIDE 20 MG: 20 TABLET ORAL at 10:12

## 2024-12-26 NOTE — ED PROVIDER NOTES
Encounter Date: 12/25/2024       History     Chief Complaint   Patient presents with    Joint Swelling     Arrives with c/o spontaneous (R) wrist swelling onset yesterday    Vomiting     Vomiting x1 episode after eating lunch, denies nausea at this time. Reports diffuse gas pains.     69-year-old female presents with concerns about her right wrist and hand.  She has some mild soft tissue swelling, but denies any significant pain.  She does not recall injuring the wrist or hand at any point.  She also vomited once earlier and has a occasional stomach cramps.  These latter symptoms has been going on for ever since she had C diff several months ago.  There is no worsening in her ongoing gastrointestinal symptoms.    The history is provided by the patient and medical records.     Review of patient's allergies indicates:   Allergen Reactions    Hydrocodone Rash     Past Medical History:   Diagnosis Date    Allergy     Clotting disorder     Dementia     Diabetes mellitus     GERD (gastroesophageal reflux disease)     Glaucoma     Hypertension     Stroke      Past Surgical History:   Procedure Laterality Date    OOPHORECTOMY      Pace Maker Right     PARTIAL HYSTERECTOMY      stents      x5    WRIST FRACTURE SURGERY Bilateral      Family History   Problem Relation Name Age of Onset    Heart disease Father      Heart disease Mother      Diabetes Mother      Lupus Sister       Social History     Tobacco Use    Smoking status: Every Day     Current packs/day: 0.50     Types: Cigarettes    Smokeless tobacco: Never   Substance Use Topics    Alcohol use: Not Currently    Drug use: Not Currently     Review of Systems   Constitutional:  Negative for fever.   HENT:  Negative for sore throat.    Respiratory:  Negative for shortness of breath.    Cardiovascular:  Negative for chest pain.   Gastrointestinal:  Positive for abdominal pain and nausea.   Genitourinary:  Negative for dysuria.   Musculoskeletal:  Negative for back pain.         Right hand and wrist swelling   Skin:  Positive for color change. Negative for rash.   Neurological:  Negative for weakness.   Hematological:  Does not bruise/bleed easily.   All other systems reviewed and are negative.      Physical Exam     Initial Vitals [12/25/24 2210]   BP Pulse Resp Temp SpO2   131/60 74 18 97.3 °F (36.3 °C) 99 %      MAP       --         Physical Exam    Nursing note and vitals reviewed.  Constitutional: Vital signs are normal. She appears well-developed and well-nourished. She is cooperative.   HENT:   Head: Normocephalic and atraumatic. Mouth/Throat: Oropharynx is clear and moist.   Eyes: Conjunctivae, EOM and lids are normal. Pupils are equal, round, and reactive to light.   Neck: Trachea normal. Neck supple.   Normal range of motion.  Cardiovascular:  Normal rate, regular rhythm, normal heart sounds and intact distal pulses.           Pulmonary/Chest: Breath sounds normal.   Abdominal: Abdomen is soft. Bowel sounds are normal. She exhibits no distension. There is no abdominal tenderness. There is no rebound and no guarding.   Musculoskeletal:         General: Normal range of motion.      Cervical back: Normal, normal range of motion and neck supple.      Lumbar back: Normal.      Comments: There was some very superficial edema on the dorsal aspect of the right hand and wrist associated with ecchymoses.  There is no tenderness.  There is full range of motion.  There was no warmth.     Neurological: She is alert and oriented to person, place, and time. She has normal strength. Coordination normal. GCS score is 15. GCS eye subscore is 4. GCS verbal subscore is 5. GCS motor subscore is 6.   Skin: Skin is warm, dry and intact. Capillary refill takes less than 2 seconds.   Psychiatric: She has a normal mood and affect. Her speech is normal and behavior is normal. Judgment and thought content normal. Cognition and memory are normal.         ED Course   Procedures  Labs Reviewed - No data to  display       Imaging Results    None          Medications   dicyclomine tablet 20 mg (has no administration in time range)     Medical Decision Making  Right hand and wrist symptoms appear to be due to a contusion or broken blood vessel.  There is no indication of infection or inflammation.  She does not appear to be dehydrated, and her abdominal exam is benign.  The GI symptoms are chronic and ongoing, and not acute.    Risk  Prescription drug management.                                      Clinical Impression:  Final diagnoses:  [R11.2] Nausea and vomiting, unspecified vomiting type  [S60.211A] Contusion of right wrist, initial encounter (Primary)          ED Disposition Condition    Discharge Good          ED Prescriptions       Medication Sig Dispense Start Date End Date Auth. Provider    ondansetron (ZOFRAN-ODT) 4 MG TbDL Take 1 tablet (4 mg total) by mouth every 8 (eight) hours as needed. 20 tablet 12/25/2024 -- Papito Sampson MD    dicyclomine (BENTYL) 20 mg tablet Take 1 tablet (20 mg total) by mouth every 6 (six) hours as needed (Abdominal Pain). 20 tablet 12/25/2024 -- Papito Sampson MD          Follow-up Information       Follow up With Specialties Details Why Contact Info    Roger Duenas III, MD Family Medicine Call in 1 day  [address] [id number]  [phone]               Papito Sampson MD  12/25/24 3828

## 2024-12-30 ENCOUNTER — HOSPITAL ENCOUNTER (OUTPATIENT)
Dept: RADIOLOGY | Facility: HOSPITAL | Age: 69
Discharge: HOME | End: 2024-12-30
Attending: FAMILY MEDICINE
Payer: MEDICARE

## 2024-12-30 DIAGNOSIS — R10.9 STOMACH ACHE: ICD-10-CM

## 2024-12-30 PROCEDURE — 74019 RADEX ABDOMEN 2 VIEWS: CPT | Mod: TC

## 2025-01-06 ENCOUNTER — HOSPITAL ENCOUNTER (OUTPATIENT)
Dept: RADIOLOGY | Facility: HOSPITAL | Age: 70
Discharge: HOME | End: 2025-01-06
Attending: FAMILY MEDICINE
Payer: MEDICARE

## 2025-01-06 DIAGNOSIS — R94.5 ABNORMAL RESULTS OF LIVER FUNCTION STUDIES: ICD-10-CM

## 2025-01-06 PROCEDURE — 76705 ECHO EXAM OF ABDOMEN: CPT | Mod: TC
